# Patient Record
Sex: FEMALE | Race: WHITE | Employment: UNEMPLOYED | URBAN - METROPOLITAN AREA
[De-identification: names, ages, dates, MRNs, and addresses within clinical notes are randomized per-mention and may not be internally consistent; named-entity substitution may affect disease eponyms.]

---

## 2017-03-18 ENCOUNTER — HOSPITAL ENCOUNTER (OUTPATIENT)
Dept: MAMMOGRAPHY | Age: 42
Discharge: HOME | End: 2017-03-18
Payer: COMMERCIAL

## 2017-03-18 DIAGNOSIS — Z12.31 VISIT FOR SCREENING MAMMOGRAM: ICD-10-CM

## 2017-03-18 PROCEDURE — 77067 SCR MAMMO BI INCL CAD: CPT

## 2017-03-28 ENCOUNTER — APPOINTMENT (OUTPATIENT)
Dept: GENERAL RADIOLOGY | Age: 42
DRG: 872 | End: 2017-03-28
Attending: EMERGENCY MEDICINE
Payer: COMMERCIAL

## 2017-03-28 ENCOUNTER — HOSPITAL ENCOUNTER (INPATIENT)
Age: 42
LOS: 2 days | Discharge: HOME | DRG: 872 | End: 2017-03-30
Attending: EMERGENCY MEDICINE | Admitting: INTERNAL MEDICINE
Payer: COMMERCIAL

## 2017-03-28 DIAGNOSIS — A41.9 SEPSIS, DUE TO UNSPECIFIED ORGANISM: Primary | ICD-10-CM

## 2017-03-28 PROBLEM — J47.0 BRONCHIECTASIS WITH ACUTE LOWER RESPIRATORY INFECTION (HCC): Status: ACTIVE | Noted: 2017-03-28

## 2017-03-28 PROBLEM — R00.0 SINUS TACHYCARDIA: Status: ACTIVE | Noted: 2017-03-28

## 2017-03-28 PROBLEM — R55 NEAR SYNCOPE: Status: ACTIVE | Noted: 2017-03-28

## 2017-03-28 LAB
ALBUMIN SERPL BCP-MCNC: 3.4 G/DL (ref 3.5–5)
ALBUMIN/GLOB SERPL: 0.9 {RATIO} (ref 1.2–3.5)
ALP SERPL-CCNC: 65 U/L (ref 50–136)
ALT SERPL-CCNC: 33 U/L (ref 12–65)
ANION GAP BLD CALC-SCNC: 6 MMOL/L (ref 7–16)
AST SERPL W P-5'-P-CCNC: 37 U/L (ref 15–37)
ATRIAL RATE: 136 BPM
BASOPHILS # BLD AUTO: 0 K/UL (ref 0–0.2)
BASOPHILS # BLD: 0 % (ref 0–2)
BILIRUB SERPL-MCNC: 0.6 MG/DL (ref 0.2–1.1)
BUN SERPL-MCNC: 16 MG/DL (ref 6–23)
CALCIUM SERPL-MCNC: 7.9 MG/DL (ref 8.3–10.4)
CALCULATED R AXIS, ECG10: 83 DEGREES
CALCULATED T AXIS, ECG11: 59 DEGREES
CHLORIDE SERPL-SCNC: 108 MMOL/L (ref 98–107)
CO2 SERPL-SCNC: 26 MMOL/L (ref 21–32)
CREAT SERPL-MCNC: 0.9 MG/DL (ref 0.6–1)
DIAGNOSIS, 93000: NORMAL
DIFFERENTIAL METHOD BLD: ABNORMAL
EOSINOPHIL # BLD: 0.1 K/UL (ref 0–0.8)
EOSINOPHIL NFR BLD: 1 % (ref 0.5–7.8)
ERYTHROCYTE [DISTWIDTH] IN BLOOD BY AUTOMATED COUNT: 14.7 % (ref 11.9–14.6)
FLUAV AG NPH QL IA: NEGATIVE
FLUBV AG NPH QL IA: NEGATIVE
GLOBULIN SER CALC-MCNC: 3.6 G/DL (ref 2.3–3.5)
GLUCOSE SERPL-MCNC: 101 MG/DL (ref 65–100)
HCG UR QL: NEGATIVE
HCT VFR BLD AUTO: 49.1 % (ref 35.8–46.3)
HGB BLD-MCNC: 16.4 G/DL (ref 11.7–15.4)
IMM GRANULOCYTES # BLD: 0.1 K/UL (ref 0–0.5)
IMM GRANULOCYTES NFR BLD AUTO: 0.3 % (ref 0–5)
LACTATE BLD-SCNC: 2.3 MMOL/L (ref 0.5–1.9)
LYMPHOCYTES # BLD AUTO: 4 % (ref 13–44)
LYMPHOCYTES # BLD: 0.8 K/UL (ref 0.5–4.6)
MCH RBC QN AUTO: 32.7 PG (ref 26.1–32.9)
MCHC RBC AUTO-ENTMCNC: 33.4 G/DL (ref 31.4–35)
MCV RBC AUTO: 98 FL (ref 79.6–97.8)
MONOCYTES # BLD: 0.4 K/UL (ref 0.1–1.3)
MONOCYTES NFR BLD AUTO: 2 % (ref 4–12)
NEUTS SEG # BLD: 17.6 K/UL (ref 1.7–8.2)
NEUTS SEG NFR BLD AUTO: 93 % (ref 43–78)
P-R INTERVAL, ECG05: 120 MS
PLATELET # BLD AUTO: 420 K/UL (ref 150–450)
PMV BLD AUTO: 10.7 FL (ref 10.8–14.1)
POTASSIUM SERPL-SCNC: 4 MMOL/L (ref 3.5–5.1)
PROT SERPL-MCNC: 7 G/DL (ref 6.3–8.2)
Q-T INTERVAL, ECG07: 370 MS
QRS DURATION, ECG06: 72 MS
QTC CALCULATION (BEZET), ECG08: 556 MS
RBC # BLD AUTO: 5.01 M/UL (ref 4.05–5.25)
SODIUM SERPL-SCNC: 140 MMOL/L (ref 136–145)
VENTRICULAR RATE, ECG03: 136 BPM
WBC # BLD AUTO: 19 K/UL (ref 4.3–11.1)

## 2017-03-28 PROCEDURE — 96361 HYDRATE IV INFUSION ADD-ON: CPT | Performed by: EMERGENCY MEDICINE

## 2017-03-28 PROCEDURE — 74011250637 HC RX REV CODE- 250/637: Performed by: EMERGENCY MEDICINE

## 2017-03-28 PROCEDURE — 87804 INFLUENZA ASSAY W/OPTIC: CPT | Performed by: EMERGENCY MEDICINE

## 2017-03-28 PROCEDURE — 81003 URINALYSIS AUTO W/O SCOPE: CPT | Performed by: EMERGENCY MEDICINE

## 2017-03-28 PROCEDURE — 96374 THER/PROPH/DIAG INJ IV PUSH: CPT | Performed by: EMERGENCY MEDICINE

## 2017-03-28 PROCEDURE — 81025 URINE PREGNANCY TEST: CPT

## 2017-03-28 PROCEDURE — 83605 ASSAY OF LACTIC ACID: CPT

## 2017-03-28 PROCEDURE — 74011250636 HC RX REV CODE- 250/636: Performed by: EMERGENCY MEDICINE

## 2017-03-28 PROCEDURE — 87040 BLOOD CULTURE FOR BACTERIA: CPT | Performed by: EMERGENCY MEDICINE

## 2017-03-28 PROCEDURE — 99285 EMERGENCY DEPT VISIT HI MDM: CPT | Performed by: EMERGENCY MEDICINE

## 2017-03-28 PROCEDURE — 85025 COMPLETE CBC W/AUTO DIFF WBC: CPT | Performed by: EMERGENCY MEDICINE

## 2017-03-28 PROCEDURE — 71020 XR CHEST PA LAT: CPT

## 2017-03-28 PROCEDURE — 80053 COMPREHEN METABOLIC PANEL: CPT | Performed by: EMERGENCY MEDICINE

## 2017-03-28 PROCEDURE — 65660000000 HC RM CCU STEPDOWN

## 2017-03-28 PROCEDURE — 93005 ELECTROCARDIOGRAM TRACING: CPT | Performed by: EMERGENCY MEDICINE

## 2017-03-28 PROCEDURE — 74011250636 HC RX REV CODE- 250/636: Performed by: INTERNAL MEDICINE

## 2017-03-28 RX ORDER — METFORMIN HYDROCHLORIDE 850 MG/1
850 TABLET ORAL
Status: DISCONTINUED | OUTPATIENT
Start: 2017-03-29 | End: 2017-03-30 | Stop reason: HOSPADM

## 2017-03-28 RX ORDER — BUDESONIDE 0.5 MG/2ML
500 INHALANT ORAL
Status: DISCONTINUED | OUTPATIENT
Start: 2017-03-28 | End: 2017-03-30 | Stop reason: HOSPADM

## 2017-03-28 RX ORDER — SODIUM CHLORIDE 0.9 % (FLUSH) 0.9 %
5-10 SYRINGE (ML) INJECTION AS NEEDED
Status: DISCONTINUED | OUTPATIENT
Start: 2017-03-28 | End: 2017-03-30 | Stop reason: HOSPADM

## 2017-03-28 RX ORDER — LORAZEPAM 0.5 MG/1
0.5 TABLET ORAL
Status: DISCONTINUED | OUTPATIENT
Start: 2017-03-28 | End: 2017-03-30 | Stop reason: HOSPADM

## 2017-03-28 RX ORDER — ACETAMINOPHEN 500 MG
1000 TABLET ORAL
Status: COMPLETED | OUTPATIENT
Start: 2017-03-28 | End: 2017-03-28

## 2017-03-28 RX ORDER — GUAIFENESIN 100 MG/5ML
100 SOLUTION ORAL
Status: DISCONTINUED | OUTPATIENT
Start: 2017-03-28 | End: 2017-03-30 | Stop reason: HOSPADM

## 2017-03-28 RX ORDER — ACETAMINOPHEN AND CODEINE PHOSPHATE 120; 12 MG/5ML; MG/5ML
0.35 SOLUTION ORAL DAILY
Status: DISCONTINUED | OUTPATIENT
Start: 2017-03-29 | End: 2017-03-30 | Stop reason: HOSPADM

## 2017-03-28 RX ORDER — HYDROCHLOROTHIAZIDE 25 MG/1
12.5 TABLET ORAL DAILY
Status: DISCONTINUED | OUTPATIENT
Start: 2017-03-29 | End: 2017-03-30 | Stop reason: HOSPADM

## 2017-03-28 RX ORDER — SODIUM CHLORIDE 0.9 % (FLUSH) 0.9 %
5-10 SYRINGE (ML) INJECTION EVERY 8 HOURS
Status: DISCONTINUED | OUTPATIENT
Start: 2017-03-28 | End: 2017-03-30 | Stop reason: HOSPADM

## 2017-03-28 RX ORDER — OXYCODONE HYDROCHLORIDE 5 MG/1
5 TABLET ORAL
Status: COMPLETED | OUTPATIENT
Start: 2017-03-28 | End: 2017-03-28

## 2017-03-28 RX ORDER — ALBUTEROL SULFATE 2.5 MG/.5ML
2.5 SOLUTION RESPIRATORY (INHALATION)
Status: DISCONTINUED | OUTPATIENT
Start: 2017-03-29 | End: 2017-03-30 | Stop reason: HOSPADM

## 2017-03-28 RX ORDER — ASPIRIN 81 MG/1
81 TABLET ORAL DAILY
Status: DISCONTINUED | OUTPATIENT
Start: 2017-03-29 | End: 2017-03-30 | Stop reason: HOSPADM

## 2017-03-28 RX ORDER — IPRATROPIUM BROMIDE AND ALBUTEROL SULFATE 2.5; .5 MG/3ML; MG/3ML
3 SOLUTION RESPIRATORY (INHALATION)
Status: DISCONTINUED | OUTPATIENT
Start: 2017-03-28 | End: 2017-03-30 | Stop reason: HOSPADM

## 2017-03-28 RX ORDER — LEVOFLOXACIN 5 MG/ML
750 INJECTION, SOLUTION INTRAVENOUS EVERY 24 HOURS
Status: DISCONTINUED | OUTPATIENT
Start: 2017-03-28 | End: 2017-03-30 | Stop reason: HOSPADM

## 2017-03-28 RX ORDER — ALBUTEROL SULFATE 90 UG/1
2 AEROSOL, METERED RESPIRATORY (INHALATION)
Status: DISCONTINUED | OUTPATIENT
Start: 2017-03-28 | End: 2017-03-28

## 2017-03-28 RX ORDER — ONDANSETRON 2 MG/ML
4 INJECTION INTRAMUSCULAR; INTRAVENOUS
Status: COMPLETED | OUTPATIENT
Start: 2017-03-28 | End: 2017-03-28

## 2017-03-28 RX ADMIN — SODIUM CHLORIDE 2200 ML: 900 INJECTION, SOLUTION INTRAVENOUS at 19:00

## 2017-03-28 RX ADMIN — AZITHROMYCIN MONOHYDRATE 500 MG: 500 INJECTION, POWDER, LYOPHILIZED, FOR SOLUTION INTRAVENOUS at 23:55

## 2017-03-28 RX ADMIN — SODIUM CHLORIDE 500 ML: 900 INJECTION, SOLUTION INTRAVENOUS at 23:42

## 2017-03-28 RX ADMIN — OXYCODONE HYDROCHLORIDE 5 MG: 5 TABLET ORAL at 20:46

## 2017-03-28 RX ADMIN — ACETAMINOPHEN 1000 MG: 500 TABLET ORAL at 17:28

## 2017-03-28 RX ADMIN — ONDANSETRON 4 MG: 2 INJECTION, SOLUTION INTRAMUSCULAR; INTRAVENOUS at 19:14

## 2017-03-28 RX ADMIN — SODIUM CHLORIDE 1000 ML: 900 INJECTION, SOLUTION INTRAVENOUS at 17:28

## 2017-03-28 NOTE — ED NOTES
Pt states that she passed out at primary physician office. Pt states that while waiting in the lobby she started to have fever and chills and passed out. Pt also complains of vomiting x1 that started while she was at the doctors office.

## 2017-03-28 NOTE — ED TRIAGE NOTES
Pt states that she was sent by her primary physician due to syncope. Pt states that while waiting in the lobby she started to have fever and chills. Pt also complains of vomiting. Pt states that the N/V started while she was at the doctors office.

## 2017-03-28 NOTE — ED PROVIDER NOTES
HPI Comments: 18-year-old lady with a history of bronchiectasis who presents with concerns about fatigue, weakness, shortness of breath, and not feeling well. Patient also notes that she has had 3 episodes of blacking out but have only lasted for \"a second or 2\". Patient says that she did go to her primary care doctor who sent her here for further evaluation. She says she is currently on 3 different antibiotics for her bronchiectasis and that has been managed by pulmonary. Patient notes that she does feel fairly dehydrated. Elements of this note were created using speech recognition software. As such, errors of speech recognition may be present. Patient is a 42 y.o. female presenting with dizziness. The history is provided by the patient. Dizziness   Associated symptoms include shortness of breath and nausea. Pertinent negatives include no chest pain, no vomiting, no confusion and no headaches. Past Medical History:   Diagnosis Date    Recurrent pneumonia 6575-2952    The patient reports having been hospitalized multiple times with recurrent pneumonias during a 2 year period.  Thromboembolism ([address] 5252-0732    During one of the patient's hospitalizations, she developed deep vein thrombosis and pulmonary emboli. She took Coumadin for approximately 2 years.        Past Surgical History:   Procedure Laterality Date    ENDOMETRIAL CRYOABLATION      HX CHOLECYSTECTOMY  1994    for stones    HX HEENT      T & A    HX MYOMECTOMY      ablasion and polyp    HX TONSIL AND ADENOIDECTOMY  1981         Family History:   Problem Relation Age of Onset   [facility] COPD Mother    [facility] Cancer Mother      Lung    Breast Cancer Mother 54    Diabetes Father     Heart Disease Father     Heart Disease Sister     Hypertension Brother        Social History     Social History    Marital status:      Spouse name: N/A    Number of children: N/A    Years of education: N/A     Occupational History    Service coordinator      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 She does not use alcohol. She had considerable secondhand exposure to cigarette smoke as a child from both of her parents who smoked. There is no significant environmental or industrial exposure. She has no known exposure to TB. She has always lived in this area. She has no indoor pets or birds. She is  and living with her . ALLERGIES: Keflex [cephalexin]; Vancomycin; Penicillins; Sulfa (sulfonamide antibiotics); Sulfamethoxazole; and Prednisone    Review of Systems   Constitutional: Positive for activity change, chills, fatigue and fever. Negative for diaphoresis. HENT: Negative for congestion, rhinorrhea and sore throat. Eyes: Negative for redness and visual disturbance. Respiratory: Positive for cough and shortness of breath. Negative for chest tightness and wheezing. Cardiovascular: Negative for chest pain and palpitations. Gastrointestinal: Positive for nausea. Negative for abdominal pain, blood in stool, diarrhea and vomiting. Endocrine: Negative for polydipsia and polyuria. Genitourinary: Negative for dysuria and hematuria. Musculoskeletal: Negative for arthralgias, myalgias and neck stiffness. Skin: Negative for rash. Allergic/Immunologic: Negative for environmental allergies and food allergies. Neurological: Positive for dizziness. Negative for weakness and headaches. Hematological: Negative for adenopathy. Does not bruise/bleed easily. Psychiatric/Behavioral: Negative for confusion and sleep disturbance. The patient is not nervous/anxious.         Vitals:    03/28/17 1653 03/28/17 1659 03/28/17 1721 03/28/17 1746   BP:   133/63    Pulse:       Resp:       Temp:       SpO2: 94% 98% 96% 96%   Weight:       Height:                Physical Exam   Constitutional: She is oriented to person, place, and time. She appears well-developed and well-nourished. HENT:   Head: Normocephalic and atraumatic. Eyes: Conjunctivae and EOM are normal. Pupils are equal, round, and reactive to light. Neck: Normal range of motion. Cardiovascular: Regular rhythm. Tachycardic to the 1 teens on my exam   Pulmonary/Chest: Effort normal and breath sounds normal. No respiratory distress. She has no wheezes. She has no rales. She exhibits no tenderness. Abdominal: Soft. Bowel sounds are normal. There is no rebound and no guarding. Musculoskeletal: Normal range of motion. She exhibits no edema or tenderness. Lymphadenopathy:     She has no cervical adenopathy. Neurological: She is alert and oriented to person, place, and time. Skin: Skin is warm and dry. Psychiatric: She has a normal mood and affect. Nursing note and vitals reviewed. MDM  Number of Diagnoses or Management Options  Diagnosis management comments: Patient's lactic acid is elevated as is her white blood cell count. Chest x-ray does not show any significant new infiltrate. I did discuss the case with Dr. Renata Muñoz from [facility] who is dying with the patient and asked that I call the hospitalist for further care. I spoke with the hospitalist who kindly agreed to see the patient. Patient's antibiotic choices are very limited due to the multiple allergies she has and the fact that she has been on outpatient antibiotic therapy that appears to not be working. I discussed that with the hospitalist and they will address that issue.     ED Course       Procedures

## 2017-03-28 NOTE — IP AVS SNAPSHOT
[address] 
[phone] Patient: Spring George MRN: [id number] GCB:[birth year]/37/[birth year]012 You are allergic to the following Allergen Reactions Keflex (Cephalexin) Anaphylaxis Vancomycin Anaphylaxis Penicillins Anaphylaxis Sulfa (Sulfonamide Antibiotics) Anaphylaxis Sulfamethoxazole Other (comments) Wheezing Prednisone Swelling Face turns red, nervousness Recent Documentation Height Weight Breastfeeding? BMI OB Status Smoking Status 1.6 m 117.2 kg No 45.76 kg/m2 Ablation Never Smoker Unresulted Labs Order Current Status CULTURE, BLOOD Preliminary result CULTURE, BLOOD Preliminary result Emergency Contacts Name Discharge Info Relation Home Work Mobile [address] CAREGIVER [3] Spouse [3] [phone] About your hospitalization You were admitted on:  March 28, 2017 You last received care in the:  [facility] You were discharged on:  March 30, 2017 Unit phone number:  [phone] Why you were hospitalized Your primary diagnosis was:  Sepsis (Hcc) Your diagnoses also included:  Sinus Tachycardia, Near Syncope, Bronchiectasis With Acute Lower Respiratory Infection (Hcc) Providers Seen During Your Hospitalizations Provider Role Specialty Primary office phone Laine Chase MD Attending Provider Emergency Medicine [phone] Harshal Petit MD Attending Provider Internal Medicine [phone] Your Primary Care Physician (PCP) Primary Care Physician Office Phone Office Fax Giuseppe Zhang [phone] Follow-up Information Follow up With Details Comments Contact Info David Baird MD On 4/5/2017 at [address] [facility] [address] [facility] [id number] [phone] Your Appointments Wednesday April 05, 2017  9:15 AM EDT HOSPITAL FOLLOW-UP with Albina Gonzalez MD  
[facility] Internal Medicine ([facility] INTERNAL MEDIC[facility]) [address] Delphine Garrett [place] [address]  
[phone] Wednesday April 26, 2017  2:40 PM EDT Return appointment with Melinda Ram NP [facility] ([facility]) [address] [place] [address] [facility]  
[phone] Current Discharge Medication List  
  
CONTINUE these medications which have CHANGED Dose & Instructions Dispensing Information Comments Morning Noon Evening Bedtime  
 albuterol 90 mcg/actuation inhaler Commonly known as:  PROAIR HFA What changed:  Another medication with the same name was removed. Continue taking this medication, and follow the directions you see here. Your next dose is:  When needed Dose:  2 Puff Take 2 Puffs by inhalation every four (4) hours as needed. Quantity:  1 Inhaler Refills:  11  
     
   
   
   
  
 levoFLOXacin 750 mg tablet Commonly known as:  Elliott Oconnor What changed:   
- how much to take 
- how to take this - when to take this 
- additional instructions Your last dose was:  Yesterday 03/29/17 at 2108 Your next dose is: Today 9 pm  
   
 Dose:  750 mg Take 1 Tab by mouth daily for 6 days. Quantity:  6 Tab Refills:  0 CONTINUE these medications which have NOT CHANGED Dose & Instructions Dispensing Information Comments Morning Noon Evening Bedtime  
 aspirin 81 mg tablet Your next dose is:  3/31 Dose:  81 mg Take 81 mg by mouth. Refills:  0  
     
  
   
   
   
  
 doxycycline 100 mg tablet Commonly known as:  ADOXA Your next dose is:  Home routine Take 1 tab daily for 1 week every third month Quantity:  7 Tab Refills:  5  
     
   
   
   
  
 fluticasone-salmeterol 250-50 mcg/dose diskus inhaler Commonly known as:  ADVAIR DISKUS Your next dose is:  3/30 Dose:  1 Puff Take 1 Puff by inhalation two (2) times a day. Quantity:  1 Inhaler Refills:  11  
     
  
   
   
  
   
  
 hydroCHLOROthiazide 12.5 mg tablet Commonly known as:  HYDRODIURIL Your next dose is:  3/31 Dose:  12.5 mg Take 1 Tab by mouth daily. Quantity:  30 Tab Refills:  12 LORazepam 0.5 mg tablet Commonly known as:  ATIVAN Your next dose is:  As needed Dose:  0.5 mg Take 1 Tab by mouth two (2) times daily as needed for Anxiety. Max Daily Amount: 1 mg. Quantity:  60 Tab Refills:  3  
     
   
   
   
  
 metFORMIN  mg tablet Commonly known as:  GLUCOPHAGE XR Your next dose is:  3/31 Dose:  750 mg Take 750 mg by mouth daily. Refills:  0  
     
  
   
   
   
  
 multivitamin tablet Commonly known as:  ONE A DAY Your next dose is:  3/31 Dose:  1 Tab Take 1 Tab by mouth daily. Refills:  0  
     
  
   
   
   
  
 norethindrone 0.35 mg Tab Commonly known as:  Bobby & Bobby Your next dose is:  3/31 Dose:  0.35 mg Take 0.35 mg by mouth daily. Refills:  0 STOP taking these medications   
 azithromycin 500 mg Tab Commonly known as:  Sherryle Nutley MUCINEX 600 mg ER tablet Generic drug:  guaiFENesin ER Where to Get Your Medications Information on where to get these meds will be given to you by the nurse or doctor. ! Ask your nurse or doctor about these medications  
  levoFLOXacin 750 mg tablet Discharge Instructions Bronchiectasis: Care Instructions Your Care Instructions Bronchiectasis (say \"rizbv-pus-ZQX-tuh-heber\") is a lung problem in which the breathing tubes are stretched and become larger. The buildup of mucus causes the stretching and can lead to swelling and infections.  You may find it harder to breathe and cough up mucus out of your lungs. Some people are born with it. Other people get it because of another health problem, usually cystic fibrosis or a lung infection such as pneumonia or tuberculosis. Symptoms are often different for everyone. But a cough that brings up mucus, or sputum, is common. The condition is usually treated with antibiotics, medicines to relax the airways (bronchodilators), and medicines to make it easier to cough up mucus (expectorants). Follow-up care is a key part of your treatment and safety. Be sure to make and go to all appointments, and call your doctor if you are having problems. It's also a good idea to know your test results and keep a list of the medicines you take. How can you care for yourself at home? · Take your antibiotics as directed. Do not stop taking them just because you feel better. You need to take the full course of antibiotics. · Take your medicines exactly as prescribed. Call your doctor if you think you are having a problem with your medicine. · If you have cystic fibrosis, follow your treatment plan. · If you or your child has bronchiectasis, follow directions from your doctor or respiratory therapist for moving your or your child's body into different positions to help drain fluid. This is called postural drainage, and it helps to ease breathing and prevent infections. · You also may do chest percussion on your child. This is strong clapping of the chest with a cupped hand to vibrate the airways in the lungs. The vibration helps your child cough up mucus. You may see a respiratory therapist to learn how to do chest percussion. · Use an airway clearance device, such as a flutter valve, as directed to help remove mucus from the lungs. · Avoid lung infections. ¨ Get shots to protect against the flu and pneumococcal disease. ¨ Wash your hands frequently. ¨ Avoid close contact with people who have colds or the flu. ¨ Do not smoke or allow others to smoke around you. If you need help quitting, talk to your doctor about stop-smoking programs and medicines. These can increase your chances of quitting for good. ¨ Stay inside, if you can, on days when the pollution level is high. When should you call for help? Call 911 anytime you think you may need emergency care. For example, call if: 
· You have severe trouble breathing. · You cough up more than 1 cup of blood. Call your doctor now or seek immediate medical care if: 
· You have chest pain. · You have shortness of breath. · You have a fever. · Your mucus (sputum) is bloody or changes color. Watch closely for changes in your health, and be sure to contact your doctor if: 
· You are coughing up more sputum than before. · Your symptoms get worse or do not get better with treatment. Where can you learn more? Go to http://enrique-miranda.info/. Enter C667 in the search box to learn more about \"Bronchiectasis: Care Instructions. \" Current as of: May 23, 2016 Content Version: 11.2 © 2779-1158 [employer]. Care instructions adapted under license by [employer] (which disclaims liability or warranty for this information). If you have questions about a medical condition or this instruction, always ask your healthcare professional. [address] any warranty or liability for your use of this information. Sepsis: Care Instructions Your Care Instructions Sepsis is an infection that has spread throughout your body. It is a life-threatening condition and often causes extremely low blood pressure. This can lead to problems with many different organs. The cause of sepsis is not always clear, but it can happen as part of a long-term or sudden illness. Sometimes even a mild illness can lead to sepsis. Follow-up care is a key part of your treatment and safety.  Be sure to make and go to all appointments, and call your doctor if you are having problems. Its also a good idea to know your test results and keep a list of the medicines you take. How can you care for yourself at home? · If your doctor prescribed antibiotics, take them as directed. Do not stop taking them just because you feel better. You need to take the full course of antibiotics. · Drink plenty of fluids, enough so that your urine is light yellow or clear like water. Choose water or caffeine-free clear liquids until you feel better. If you have kidney, heart, or liver disease and have to limit fluids, talk with your doctor before you increase your fluid intake. You can try rehydration drinks, such as Gatorade or Powerade. · Do not drink alcohol. · Eat a healthy diet. Include fruits, vegetables, and whole grains in your diet every day. · Walking is an easy way to get exercise. Gradually increase the amount you walk every day. Make sure your doctor knows that you are starting an exercise program. 
· Do not smoke or use other tobacco products. If you need help quitting, talk to your doctor about stop-smoking programs and medicines. These can increase your chances of quitting for good. When should you call for help? Call 911 anytime you think you may need emergency care. For example, call if: 
· You passed out (lost consciousness). Call your doctor now or seek immediate medical care if: 
· You have a fever or chills. · You have cool, pale, or clammy skin. · You are dizzy or lightheaded, or you feel like you may faint. · You have any new symptoms, such as a cough, pain in one part of your body, or urinary problems. Watch closely for changes in your health, and be sure to contact your doctor if: 
· You do not get better as expected. Where can you learn more? Go to http://enrique-miranda.info/. Enter W491 in the search box to learn more about \"Sepsis: Care Instructions. \" 
 Current as of: May 27, 2016 Content Version: 11.2 © 7084-1482 [employer]. Care instructions adapted under license by [employer] (which disclaims liability or warranty for this information). If you have questions about a medical condition or this instruction, always ask your healthcare professional. [address] any warranty or liability for your use of this information. DISCHARGE SUMMARY from Nurse The following personal items are in your possession at time of discharge: 
 
Dental Appliances: None Visual Aid: Glasses Home Medications: None Jewelry: None Clothing: Footwear, Pants, Jacket/Coat Other Valuables: None PATIENT INSTRUCTIONS: 
 
After general anesthesia or intravenous sedation, for 24 hours or while taking prescription Narcotics: 
Limit your activities Do not drive and operate hazardous machinery Do not make important personal or business decisions Do  not drink alcoholic beverages If you have not urinated within 8 hours after discharge, please contact your surgeon on call. Report the following to your surgeon: Excessive pain, swelling, redness or odor of or around the surgical area Temperature over 100.5 Nausea and vomiting lasting longer than 4 hours or if unable to take medications Any signs of decreased circulation or nerve impairment to extremity: change in color, persistent  numbness, tingling, coldness or increase pain Any questions What to do at Home: 
Recommended activity: Activity as tolerated, *  Please give a list of your current medications to your Primary Care Provider. *  Please update this list whenever your medications are discontinued, doses are 
    changed, or new medications (including over-the-counter products) are added. *  Please carry medication information at all times in case of emergency situations. These are general instructions for a healthy lifestyle: No smoking/ No tobacco products/ Avoid exposure to second hand smoke Surgeon General's Warning:  Quitting smoking now greatly reduces serious risk to your health. Obesity, smoking, and sedentary lifestyle greatly increases your risk for illness A healthy diet, regular physical exercise & weight monitoring are important for maintaining a healthy lifestyle You may be retaining fluid if you have a history of heart failure or if you experience any of the following symptoms:  Weight gain of 3 pounds or more overnight or 5 pounds in a week, increased swelling in our hands or feet or shortness of breath while lying flat in bed. Please call your doctor as soon as you notice any of these symptoms; do not wait until your next office visit. Recognize signs and symptoms of STROKE: 
 
F-face looks uneven A-arms unable to move or move unevenly S-speech slurred or non-existent T-time-call 911 as soon as signs and symptoms begin-DO NOT go Back to bed or wait to see if you get better-TIME IS BRAIN. Warning Signs of HEART ATTACK Call 911 if you have these symptoms: 
Chest discomfort. Most heart attacks involve discomfort in the center of the chest that lasts more than a few minutes, or that goes away and comes back. It can feel like uncomfortable pressure, squeezing, fullness, or pain. Discomfort in other areas of the upper body. Symptoms can include pain or discomfort in one or both arms, the back, neck, jaw, or stomach. Shortness of breath with or without chest discomfort. Other signs may include breaking out in a cold sweat, nausea, or lightheadedness. Don't wait more than five minutes to call [address]! Fast action can save your life. Calling 911 is almost always the fastest way to get lifesaving treatment. Emergency Medical Services staff can begin treatment when they arrive  up to an hour sooner than if someone gets to the hospital by car. The discharge information has been reviewed with the patient. The patient verbalized understanding. Discharge medications reviewed with the patient and appropriate educational materials and side effects teaching were provided. Discharge Orders None [employer] Announcement We are excited to announce that we are making your provider's discharge notes available to you in [employer]. You will see these notes when they are completed and signed by the physician that discharged you from your recent hospital stay. If you have any questions or concerns about any information you see in [employer], please call the Health Information Department where you were seen or reach out to your Primary Care Provider for more information about your plan of care. Introducing [facility]! Dear Susanne Clark: 
Thank you for requesting a [employer] account. Our records indicate that you already have an active [employer] account. You can access your account anytime at [email or web address]. [email or web address] Did you know that you can access your hospital and ER discharge instructions at any time in [employer]? You can also review all of your test results from your hospital stay or ER visit. Additional Information If you have questions, please visit the Frequently Asked Questions section of the [employer] website at [email or web address] Remember, [employer] is NOT to be used for urgent needs. For medical emergencies, dial 911. Now available from your iPhone and Android! General Information Please provide this summary of care documentation to your next provider. Patient Signature:  ____________________________________________________________ Date:  ____________________________________________________________  
  
[place] Provider Signature:  ____________________________________________________________ Date:  ____________________________________________________________

## 2017-03-29 LAB
GLUCOSE BLD STRIP.AUTO-MCNC: 101 MG/DL (ref 65–100)
LACTATE SERPL-SCNC: 1.6 MMOL/L (ref 0.4–2)

## 2017-03-29 PROCEDURE — 65270000029 HC RM PRIVATE

## 2017-03-29 PROCEDURE — 83605 ASSAY OF LACTIC ACID: CPT | Performed by: INTERNAL MEDICINE

## 2017-03-29 PROCEDURE — 94640 AIRWAY INHALATION TREATMENT: CPT

## 2017-03-29 PROCEDURE — 74011000250 HC RX REV CODE- 250: Performed by: INTERNAL MEDICINE

## 2017-03-29 PROCEDURE — 74011250636 HC RX REV CODE- 250/636: Performed by: INTERNAL MEDICINE

## 2017-03-29 PROCEDURE — 36415 COLL VENOUS BLD VENIPUNCTURE: CPT | Performed by: INTERNAL MEDICINE

## 2017-03-29 PROCEDURE — 74011250637 HC RX REV CODE- 250/637: Performed by: INTERNAL MEDICINE

## 2017-03-29 PROCEDURE — 94760 N-INVAS EAR/PLS OXIMETRY 1: CPT

## 2017-03-29 PROCEDURE — 82962 GLUCOSE BLOOD TEST: CPT

## 2017-03-29 PROCEDURE — 65660000000 HC RM CCU STEPDOWN

## 2017-03-29 RX ORDER — OXYCODONE AND ACETAMINOPHEN 5; 325 MG/1; MG/1
1 TABLET ORAL
Status: DISCONTINUED | OUTPATIENT
Start: 2017-03-29 | End: 2017-03-30 | Stop reason: HOSPADM

## 2017-03-29 RX ORDER — ENOXAPARIN SODIUM 100 MG/ML
40 INJECTION SUBCUTANEOUS EVERY 12 HOURS
Status: DISCONTINUED | OUTPATIENT
Start: 2017-03-29 | End: 2017-03-30 | Stop reason: HOSPADM

## 2017-03-29 RX ADMIN — LEVOFLOXACIN 750 MG: 5 INJECTION, SOLUTION INTRAVENOUS at 21:08

## 2017-03-29 RX ADMIN — Medication 10 ML: at 00:01

## 2017-03-29 RX ADMIN — ENOXAPARIN SODIUM 40 MG: 40 INJECTION SUBCUTANEOUS at 21:08

## 2017-03-29 RX ADMIN — ALBUTEROL SULFATE 2.5 MG: 2.5 SOLUTION RESPIRATORY (INHALATION) at 08:12

## 2017-03-29 RX ADMIN — HYDROCHLOROTHIAZIDE 12.5 MG: 25 TABLET ORAL at 08:01

## 2017-03-29 RX ADMIN — ACETAMINOPHEN 500 MG: 160 SOLUTION ORAL at 06:05

## 2017-03-29 RX ADMIN — Medication 5 ML: at 14:00

## 2017-03-29 RX ADMIN — MULTIPLE VITAMINS W/ MINERALS TAB 1 TABLET: TAB at 08:01

## 2017-03-29 RX ADMIN — BUDESONIDE 500 MCG: 0.5 INHALANT RESPIRATORY (INHALATION) at 08:12

## 2017-03-29 RX ADMIN — METFORMIN HYDROCHLORIDE 850 MG: 850 TABLET, FILM COATED ORAL at 08:01

## 2017-03-29 RX ADMIN — ACETAMINOPHEN 500 MG: 160 SOLUTION ORAL at 13:00

## 2017-03-29 RX ADMIN — Medication 5 ML: at 05:44

## 2017-03-29 RX ADMIN — ENOXAPARIN SODIUM 40 MG: 40 INJECTION SUBCUTANEOUS at 11:17

## 2017-03-29 RX ADMIN — LEVOFLOXACIN 750 MG: 5 INJECTION, SOLUTION INTRAVENOUS at 01:19

## 2017-03-29 RX ADMIN — BUDESONIDE 500 MCG: 0.5 INHALANT RESPIRATORY (INHALATION) at 20:54

## 2017-03-29 RX ADMIN — ALBUTEROL SULFATE 2.5 MG: 2.5 SOLUTION RESPIRATORY (INHALATION) at 01:15

## 2017-03-29 RX ADMIN — ALBUTEROL SULFATE 2.5 MG: 2.5 SOLUTION RESPIRATORY (INHALATION) at 14:30

## 2017-03-29 RX ADMIN — LORAZEPAM 0.5 MG: 0.5 TABLET ORAL at 23:56

## 2017-03-29 RX ADMIN — OXYCODONE HYDROCHLORIDE AND ACETAMINOPHEN 1 TABLET: 5; 325 TABLET ORAL at 17:30

## 2017-03-29 RX ADMIN — ASPIRIN 81 MG: 81 TABLET, COATED ORAL at 08:01

## 2017-03-29 RX ADMIN — ALBUTEROL SULFATE 2.5 MG: 2.5 SOLUTION RESPIRATORY (INHALATION) at 20:54

## 2017-03-29 RX ADMIN — AZITHROMYCIN MONOHYDRATE 500 MG: 500 INJECTION, POWDER, LYOPHILIZED, FOR SOLUTION INTRAVENOUS at 21:08

## 2017-03-29 RX ADMIN — Medication 10 ML: at 21:08

## 2017-03-29 NOTE — PROGRESS NOTES
Pt resting in bed with eyes closed. No s/sx of distress noted at this time. Pt reports headache is better at this time. Pt encouraged to call for assistance if needed call light in reach, door open will monitor.

## 2017-03-29 NOTE — PROGRESS NOTES
Hospitalist Progress Note    3/29/2017  Admit Date: 3/28/2017  3:47 PM   NAME: Swapnil Lomeli   :  1975   MRN:  [id number]   Attending: Nathalie Gomez MD  PCP:  Juan Miguel Simmons MD    SUBJECTIVE:   Patient presented with dizziness and fevers. Started on sepsis protocol. zithromax and levaquin started for bronchiectasis. Still with some dizziness and general weakness this am.        Review of Systems negative with exception of pertinent positives noted above  PHYSICAL EXAM     Visit Vitals    /66    Pulse 93    Temp 98.5 °F (36.9 °C)    Resp 22    Ht 5' 3\" (1.6 m)    Wt 117.2 kg (258 lb 4.8 oz)    LMP  (LMP Unknown)    SpO2 98%    Breastfeeding No    BMI 45.76 kg/m2      Temp (24hrs), Av.7 °F (37.1 °C), Min:98 °F (36.7 °C), Max:100.7 °F (38.2 °C)    Oxygen Therapy  O2 Sat (%): 98 % (17 08)  Pulse via Oximetry: 108 beats per minute (17 08)  O2 Device: Room air (17)  No intake or output data in the 24 hours ending 17 09   General: No acute distress    Lungs:  Coarse bs rt side   Heart:  Regular rate and rhythm,  No murmur, rub, or gallop  Abdomen: Soft, Non distended, Non tender, Positive bowel sounds  Extremities: No cyanosis, clubbing or edema  Neurologic:  No focal deficits    ASSESSMENT      Active Hospital Problems    Diagnosis Date Noted    Sinus tachycardia 2017    Sepsis ([address] 2017    Near syncope 2017    Bronchiectasis with acute lower respiratory infection ([address] 2017     Plan:  · Continue abx. Monitor cx  · Check orthostatic bp  · Fever curve improving.       DVT Prophylaxis:  lovenox    Signed By: Macario Norris MD     2017

## 2017-03-29 NOTE — PROGRESS NOTES
TRANSFER - IN REPORT:    Verbal report received from Syracuse Davis RN on Dena Sood  being received from ER for routine progression of care      Report consisted of patients Situation, Background, Assessment and   Recommendations(SBAR). Information from the following report(s) MAR was reviewed with the receiving nurse. Opportunity for questions and clarification was provided. Assessment completed upon patients arrival to unit and care assumed.

## 2017-03-29 NOTE — PROGRESS NOTES
Pt resting in bed with  at bedside. Pt alert oriented times 3 a this time. Pt complaint of headache 5/10 at this time. Pt complaints of feeling sweaty. No acute distress noted at this time. Pt reports not feeling better than before coming into the hospital. Pt encouraged to call for assistance if needed call light in reach, door open will monitor.

## 2017-03-29 NOTE — PROGRESS NOTES
Pt states that she wears a full cpt vest at home twice daily. I informed her that we do have a cpt vest wrap and also vibralung. Pt stated she would like to try vibralung and if that isn't effective she will have her  bring her full cpt vest in because she feels it is more beneficial than the cpt wrap that we have.

## 2017-03-29 NOTE — PROGRESS NOTES
Pt with complaint of headache and request something other than tylenol for headache. Pt and family asking for [facility] to see pt while in hospital. Dr. Marion Loya on floor and made aware. MD already aware and no reason for pulmonary consult at this time. Will monitor for new orders for pain meds.

## 2017-03-29 NOTE — PROGRESS NOTES
Physical assessment completed, pt alert and oriented, family at bedside, call light within reach pt on room air.

## 2017-03-29 NOTE — PROGRESS NOTES
Patient received in bed, offers no complaints. Shift assessment completed. Patient's  at bedside, will monitor.

## 2017-03-29 NOTE — PROGRESS NOTES
Problem: Interdisciplinary Rounds  Goal: Interdisciplinary Rounds  Interdisciplinary team rounds were held 3/29/2017 with the following team members:Care Management, Nursing and Clinical Coordinator and the patient. Plan of care discussed. See clinical pathway and/or care plan for interventions and desired outcomes.

## 2017-03-29 NOTE — ED NOTES
TRANSFER - OUT REPORT:    Verbal report given to Mimi Martines on Gualberto Galvez  being transferred to 8th floor for routine progression of care       Report consisted of patients Situation, Background, Assessment and   Recommendations(SBAR). Information from the following report(s) SBAR was reviewed with the receiving nurse. Lines:   Peripheral IV 03/28/17 Right Antecubital (Active)   Site Assessment Clean, dry, & intact 3/28/2017  7:19 PM   Phlebitis Assessment 0 3/28/2017  7:19 PM   Infiltration Assessment 0 3/28/2017  7:19 PM   Dressing Status Clean, dry, & intact 3/28/2017  7:19 PM        Opportunity for questions and clarification was provided.       Patient transported with:   [employer]

## 2017-03-29 NOTE — H&P
HOSPITALIST H&P/CONSULT  NAME:  Audie Hartman   Age:  42 y.o.  :   1975   MRN:   [id number]  PCP: Lyndy Mohs, MD  Treatment Team: Attending Provider: Temitope Reyna MD; Primary Nurse: Anna Cage  HPI:   42year old Perla Choe was brought to the Ed by  because of recurrent dizziness over the last 3 days and   2 episodes of syncopy today-one at work and the second one at her doctor's office. That was associated with nausea and   Vomiting. When she was evaluated in the Ed she has normal BP but she was tachycardic with low grade fever. CBC showed severe Leukocytosis with   Left shift. Her PMH could explain this. She is an unfortunate,never smoked ,never drank female who had second hand smoking from both parents  In childhood and ended up with Bronchiectasis in the right middle lobe. She does have recurrent exacerbations,last one being few weeks back. She has other significant medical History-DVT/PE,Endometriosis s/p endometrial ablation and still on progesterone,Polycystic ovaries for which she is on   Metformin. Complete ROS done and is as stated in HPI or otherwise negative  Past Medical History:   Diagnosis Date    Recurrent pneumonia [id number]    The patient reports having been hospitalized multiple times with recurrent pneumonias during a 2 year period.  Thromboembolism ([facility] [address] [id number]    During one of the patient's hospitalizations, she developed deep vein thrombosis and pulmonary emboli. She took Coumadin for approximately 2 years. Past Surgical History:   Procedure Laterality Date    ENDOMETRIAL CRYOABLATION      HX CHOLECYSTECTOMY      for stones    HX HEENT      T & A    HX MYOMECTOMY      ablasion and polyp    HX TONSIL AND ADENOIDECTOMY        Prior to Admission Medications   Prescriptions Last Dose Informant Patient Reported? Taking?    LORazepam (ATIVAN) 0.5 mg tablet   No No   Sig: Take 1 Tab by mouth two (2) times daily as needed for Anxiety. Max Daily Amount: 1 mg.   albuterol (PROAIR HFA) 90 mcg/actuation inhaler   No No   Sig: Take 2 Puffs by inhalation every four (4) hours as needed. albuterol (PROVENTIL VENTOLIN) 2.5 mg /3 mL (0.083 %) nebulizer solution   No No   Sig: 3 mL by Nebulization route every six (6) hours. DX J45.909 Asthma   aspirin 81 mg tablet   Yes No   Sig: Take 81 mg by mouth. azithromycin (ZITHROMAX) 500 mg tab   No No   Sig: Take 1 Tab by mouth daily. Take 1 daily for 5 days every third month   doxycycline (ADOXA) 100 mg tablet   No No   Sig: Take 1 tab daily for 1 week every third month   fluticasone-salmeterol (ADVAIR DISKUS) 250-50 mcg/dose diskus inhaler   No No   Sig: Take 1 Puff by inhalation two (2) times a day. guaiFENesin ER (MUCINEX) 600 mg ER tablet   Yes No   Sig: Take 600 mg by mouth two (2) times a day. hydroCHLOROthiazide (HYDRODIURIL) 12.5 mg tablet   No No   Sig: Take 1 Tab by mouth daily. levoFLOXacin (LEVAQUIN) 750 mg tablet   No No   Sig: Take 1 tab daily for 1 week every third month   metFORMIN ER (GLUCOPHAGE XR) 750 mg tablet   Yes No   Sig: Take 750 mg by mouth daily. multivitamin (ONE A DAY) tablet   Yes No   Sig: Take 1 Tab by mouth daily. norethindrone (MICRONOR) 0.35 mg tab   Yes No   Sig: Take 0.35 mg by mouth daily.       Facility-Administered Medications: None     Allergies   Allergen Reactions    Keflex [Cephalexin] Anaphylaxis    Vancomycin Anaphylaxis    Penicillins Anaphylaxis    Sulfa (Sulfonamide Antibiotics) Anaphylaxis    Sulfamethoxazole Other (comments)     Wheezing     Prednisone Swelling     Face turns red, nervousness      Social History   Substance Use Topics    Smoking status: Never Smoker    Smokeless tobacco: Never Used    Alcohol use No      Family History   Problem Relation Age of Onset    COPD Mother     Cancer Mother      Lung    Breast Cancer Mother 54    Diabetes Father     Heart Disease Father     Heart Disease Sister     Hypertension Brother       Objective:     Visit Vitals    /63    Pulse (!) 140    Temp (!) 100.7 °F (38.2 °C)    Resp 18    Ht 5' 3\" (1.6 m)    Wt 108.9 kg (240 lb)    SpO2 96%    BMI 42.51 kg/m2      Temp (24hrs), Av.5 °F (37.5 °C), Min:98.3 °F (36.8 °C), Max:100.7 °F (38.2 °C)    Oxygen Therapy  O2 Sat (%): 96 % (17)  Pulse via Oximetry: 119 beats per minute (17)  O2 Device: Room air (17)  Physical Exam:  General:    Well developed ,obese  female in noacute distress. Head:   Normocephalic, atraumatic,EOMI,PERRLA,Neck supple. Nose:  Nares normal. No drainage or sinus tenderness. Lungs:   Clear to auscultation bilaterally. No Wheezing or Rhonchi. No rales. Heart:   S1S2 tachycardic,  no murmur, rub or gallop. Abdomen:   Full,moves with respiration,soft, non-tender. no organomegaly,normal bowel sounds. Extremities: No cyanosis. No edema. No clubbing  Skin:     Texture, turgor normal. No rashes or lesions. Not Jaundiced  Neurologic: Alert and oriented  In all spheres. No focal neurologic deficit.   Data Review:   Recent Results (from the past 24 hour(s))   EKG, 12 LEAD, INITIAL    Collection Time: 17  1:36 PM   Result Value Ref Range    Ventricular Rate 136 BPM    Atrial Rate 136 BPM    P-R Interval 120 ms    QRS Duration 72 ms    Q-T Interval 370 ms    QTC Calculation (Bezet) 556 ms    Calculated R Axis 83 degrees    Calculated T Axis 59 degrees    Diagnosis       Sinus tachycardia  Nonspecific T wave abnormality  Abnormal ECG  No previous ECGs available  Confirmed by Sukhdev Holland MD (), CARLITOS DAWSON ([id number]) on 3/28/2017 4:42:48 PM     CBC WITH AUTOMATED DIFF    Collection Time: 17  1:47 PM   Result Value Ref Range    WBC 19.0 (H) 4.3 - 11.1 K/uL    RBC 5.01 4.05 - 5.25 M/uL    HGB 16.4 (H) 11.7 - 15.4 g/dL    HCT 49.1 (H) 35.8 - 46.3 %    MCV 98.0 (H) 79.6 - 97.8 FL    MCH 32.7 26.1 - 32.9 PG    MCHC 33.4 31.4 - 35.0 g/dL    RDW 14.7 (H) 11.9 - 14.6 % PLATELET 304 039 - 201 K/uL    MPV 10.7 (L) 10.8 - 14.1 FL    DF AUTOMATED      NEUTROPHILS 93 (H) 43 - 78 %    LYMPHOCYTES 4 (L) 13 - 44 %    MONOCYTES 2 (L) 4.0 - 12.0 %    EOSINOPHILS 1 0.5 - 7.8 %    BASOPHILS 0 0.0 - 2.0 %    IMMATURE GRANULOCYTES 0.3 0.0 - 5.0 %    ABS. NEUTROPHILS 17.6 (H) 1.7 - 8.2 K/UL    ABS. LYMPHOCYTES 0.8 0.5 - 4.6 K/UL    ABS. MONOCYTES 0.4 0.1 - 1.3 K/UL    ABS. EOSINOPHILS 0.1 0.0 - 0.8 K/UL    ABS. BASOPHILS 0.0 0.0 - 0.2 K/UL    ABS. IMM. GRANS. 0.1 0.0 - 0.5 K/UL   INFLUENZA A & B AG (RAPID TEST)    Collection Time: 03/28/17  4:55 PM   Result Value Ref Range    Influenza A Ag NEGATIVE  NEG      Influenza B Ag NEGATIVE  NEG     METABOLIC PANEL, COMPREHENSIVE    Collection Time: 03/28/17  6:30 PM   Result Value Ref Range    Sodium 140 136 - 145 mmol/L    Potassium 4.0 3.5 - 5.1 mmol/L    Chloride 108 (H) 98 - 107 mmol/L    CO2 26 21 - 32 mmol/L    Anion gap 6 (L) 7 - 16 mmol/L    Glucose 101 (H) 65 - 100 mg/dL    BUN 16 6 - 23 MG/DL    Creatinine 0.90 0.6 - 1.0 MG/DL    GFR est AA >60 >60 ml/min/1.73m2    GFR est non-AA >60 >60 ml/min/1.73m2    Calcium 7.9 (L) 8.3 - 10.4 MG/DL    Bilirubin, total 0.6 0.2 - 1.1 MG/DL    ALT (SGPT) 33 12 - 65 U/L    AST (SGOT) 37 15 - 37 U/L    Alk.  phosphatase 65 50 - 136 U/L    Protein, total 7.0 6.3 - 8.2 g/dL    Albumin 3.4 (L) 3.5 - 5.0 g/dL    Globulin 3.6 (H) 2.3 - 3.5 g/dL    A-G Ratio 0.9 (L) 1.2 - 3.5     POC LACTIC ACID    Collection Time: 03/28/17  6:35 PM   Result Value Ref Range    Lactic Acid (POC) 2.3 (H) 0.5 - 1.9 mmol/L   HCG URINE, QL. - POC    Collection Time: 03/28/17  6:54 PM   Result Value Ref Range    Pregnancy test,urine (POC) NEGATIVE  NEG           Assessment and Plan:   -SEPSIS  -NEAR SYNCOPE  -SINUS TACHYCARDIA  -BRONCHIECTASIS RML  -ADMIT TO MEDICINE WITH REMOTE TELEMETRY  -IV LEVAQUIN 2 GM Q 24 HRS  -IV ZITHROMAX  -IVF NORMAL SALINE AT 75 MLS /HR X 1 LITER  -TYLENOL PRN FEVER/PAIN  -ALBUTEROL NEBS PRN  -ROBITUSSIN -HOME MEDS  ·          Marc Calvillo MD

## 2017-03-30 VITALS
HEART RATE: 86 BPM | SYSTOLIC BLOOD PRESSURE: 118 MMHG | BODY MASS INDEX: 45.77 KG/M2 | RESPIRATION RATE: 20 BRPM | WEIGHT: 258.3 LBS | HEIGHT: 63 IN | TEMPERATURE: 97.8 F | DIASTOLIC BLOOD PRESSURE: 60 MMHG | OXYGEN SATURATION: 97 %

## 2017-03-30 LAB
ERYTHROCYTE [DISTWIDTH] IN BLOOD BY AUTOMATED COUNT: 14.6 % (ref 11.9–14.6)
GLUCOSE BLD STRIP.AUTO-MCNC: 94 MG/DL (ref 65–100)
HCT VFR BLD AUTO: 38.3 % (ref 35.8–46.3)
HGB BLD-MCNC: 12.4 G/DL (ref 11.7–15.4)
MCH RBC QN AUTO: 31.6 PG (ref 26.1–32.9)
MCHC RBC AUTO-ENTMCNC: 32.4 G/DL (ref 31.4–35)
MCV RBC AUTO: 97.7 FL (ref 79.6–97.8)
PLATELET # BLD AUTO: 303 K/UL (ref 150–450)
PMV BLD AUTO: 10.1 FL (ref 10.8–14.1)
RBC # BLD AUTO: 3.92 M/UL (ref 4.05–5.25)
WBC # BLD AUTO: 4.7 K/UL (ref 4.3–11.1)

## 2017-03-30 PROCEDURE — 74011250636 HC RX REV CODE- 250/636: Performed by: INTERNAL MEDICINE

## 2017-03-30 PROCEDURE — 74011000250 HC RX REV CODE- 250: Performed by: INTERNAL MEDICINE

## 2017-03-30 PROCEDURE — 85027 COMPLETE CBC AUTOMATED: CPT | Performed by: INTERNAL MEDICINE

## 2017-03-30 PROCEDURE — 36415 COLL VENOUS BLD VENIPUNCTURE: CPT | Performed by: INTERNAL MEDICINE

## 2017-03-30 PROCEDURE — 94640 AIRWAY INHALATION TREATMENT: CPT

## 2017-03-30 PROCEDURE — 74011250637 HC RX REV CODE- 250/637: Performed by: INTERNAL MEDICINE

## 2017-03-30 PROCEDURE — 82962 GLUCOSE BLOOD TEST: CPT

## 2017-03-30 PROCEDURE — 94760 N-INVAS EAR/PLS OXIMETRY 1: CPT

## 2017-03-30 RX ORDER — LEVOFLOXACIN 750 MG/1
750 TABLET ORAL DAILY
Qty: 6 TAB | Refills: 0 | Status: SHIPPED | OUTPATIENT
Start: 2017-03-30 | End: 2017-04-05

## 2017-03-30 RX ADMIN — ALBUTEROL SULFATE 2.5 MG: 2.5 SOLUTION RESPIRATORY (INHALATION) at 07:41

## 2017-03-30 RX ADMIN — ALBUTEROL SULFATE 2.5 MG: 2.5 SOLUTION RESPIRATORY (INHALATION) at 02:18

## 2017-03-30 RX ADMIN — OXYCODONE HYDROCHLORIDE AND ACETAMINOPHEN 1 TABLET: 5; 325 TABLET ORAL at 09:29

## 2017-03-30 RX ADMIN — MULTIPLE VITAMINS W/ MINERALS TAB 1 TABLET: TAB at 09:22

## 2017-03-30 RX ADMIN — ASPIRIN 81 MG: 81 TABLET, COATED ORAL at 09:22

## 2017-03-30 RX ADMIN — METFORMIN HYDROCHLORIDE 850 MG: 850 TABLET, FILM COATED ORAL at 09:22

## 2017-03-30 RX ADMIN — BUDESONIDE 500 MCG: 0.5 INHALANT RESPIRATORY (INHALATION) at 07:41

## 2017-03-30 RX ADMIN — HYDROCHLOROTHIAZIDE 12.5 MG: 25 TABLET ORAL at 09:22

## 2017-03-30 RX ADMIN — ENOXAPARIN SODIUM 40 MG: 40 INJECTION SUBCUTANEOUS at 09:22

## 2017-03-30 RX ADMIN — Medication 10 ML: at 05:28

## 2017-03-30 NOTE — PROGRESS NOTES
Patient sleeping in bed, no s/s distress/discomfort noted. Patient's  remains at bedside, will monitor.

## 2017-03-30 NOTE — DISCHARGE INSTRUCTIONS
Bronchiectasis: Care Instructions  Your Care Instructions  Bronchiectasis (say \"ujima-kjf-JBV-tuh-heber\") is a lung problem in which the breathing tubes are stretched and become larger. The buildup of mucus causes the stretching and can lead to swelling and infections. You may find it harder to breathe and cough up mucus out of your lungs. Some people are born with it. Other people get it because of another health problem, usually cystic fibrosis or a lung infection such as pneumonia or tuberculosis. Symptoms are often different for everyone. But a cough that brings up mucus, or sputum, is common. The condition is usually treated with antibiotics, medicines to relax the airways (bronchodilators), and medicines to make it easier to cough up mucus (expectorants). Follow-up care is a key part of your treatment and safety. Be sure to make and go to all appointments, and call your doctor if you are having problems. It's also a good idea to know your test results and keep a list of the medicines you take. How can you care for yourself at home? · Take your antibiotics as directed. Do not stop taking them just because you feel better. You need to take the full course of antibiotics. · Take your medicines exactly as prescribed. Call your doctor if you think you are having a problem with your medicine. · If you have cystic fibrosis, follow your treatment plan. · If you or your child has bronchiectasis, follow directions from your doctor or respiratory therapist for moving your or your child's body into different positions to help drain fluid. This is called postural drainage, and it helps to ease breathing and prevent infections. · You also may do chest percussion on your child. This is strong clapping of the chest with a cupped hand to vibrate the airways in the lungs. The vibration helps your child cough up mucus. You may see a respiratory therapist to learn how to do chest percussion.   · Use an airway clearance device, such as a flutter valve, as directed to help remove mucus from the lungs. · Avoid lung infections. ¨ Get shots to protect against the flu and pneumococcal disease. ¨ Wash your hands frequently. ¨ Avoid close contact with people who have colds or the flu. ¨ Do not smoke or allow others to smoke around you. If you need help quitting, talk to your doctor about stop-smoking programs and medicines. These can increase your chances of quitting for good. ¨ Stay inside, if you can, on days when the pollution level is high. When should you call for help? Call 911 anytime you think you may need emergency care. For example, call if:  · You have severe trouble breathing. · You cough up more than 1 cup of blood. Call your doctor now or seek immediate medical care if:  · You have chest pain. · You have shortness of breath. · You have a fever. · Your mucus (sputum) is bloody or changes color. Watch closely for changes in your health, and be sure to contact your doctor if:  · You are coughing up more sputum than before. · Your symptoms get worse or do not get better with treatment. Where can you learn more? Go to http://enrique-miranda.info/. Enter C667 in the search box to learn more about \"Bronchiectasis: Care Instructions. \"  Current as of: May 23, 2016  Content Version: 11.2  © 5909-1252 [employer]. Care instructions adapted under license by [employer] (which disclaims liability or warranty for this information). If you have questions about a medical condition or this instruction, always ask your healthcare professional. [place] [address] any warranty or liability for your use of this information. Sepsis: Care Instructions  Your Care Instructions  Sepsis is an infection that has spread throughout your body. It is a life-threatening condition and often causes extremely low blood pressure.  This can lead to problems with many different organs. The cause of sepsis is not always clear, but it can happen as part of a long-term or sudden illness. Sometimes even a mild illness can lead to sepsis. Follow-up care is a key part of your treatment and safety. Be sure to make and go to all appointments, and call your doctor if you are having problems. Its also a good idea to know your test results and keep a list of the medicines you take. How can you care for yourself at home? · If your doctor prescribed antibiotics, take them as directed. Do not stop taking them just because you feel better. You need to take the full course of antibiotics. · Drink plenty of fluids, enough so that your urine is light yellow or clear like water. Choose water or caffeine-free clear liquids until you feel better. If you have kidney, heart, or liver disease and have to limit fluids, talk with your doctor before you increase your fluid intake. You can try rehydration drinks, such as Gatorade or Powerade. · Do not drink alcohol. · Eat a healthy diet. Include fruits, vegetables, and whole grains in your diet every day. · Walking is an easy way to get exercise. Gradually increase the amount you walk every day. Make sure your doctor knows that you are starting an exercise program.  · Do not smoke or use other tobacco products. If you need help quitting, talk to your doctor about stop-smoking programs and medicines. These can increase your chances of quitting for good. When should you call for help? Call 911 anytime you think you may need emergency care. For example, call if:  · You passed out (lost consciousness). Call your doctor now or seek immediate medical care if:  · You have a fever or chills. · You have cool, pale, or clammy skin. · You are dizzy or lightheaded, or you feel like you may faint. · You have any new symptoms, such as a cough, pain in one part of your body, or urinary problems.   Watch closely for changes in your health, and be sure to contact your doctor if:  · You do not get better as expected. Where can you learn more? Go to http://enrique-miranda.info/. Enter R582 in the search box to learn more about \"Sepsis: Care Instructions. \"  Current as of: May 27, 2016  Content Version: 11.2  © 9614-6853 [employer]. Care instructions adapted under license by [employer] (which disclaims liability or warranty for this information). If you have questions about a medical condition or this instruction, always ask your healthcare professional. [address] any warranty or liability for your use of this information. DISCHARGE SUMMARY from Nurse    The following personal items are in your possession at time of discharge:    Dental Appliances: None  Visual Aid: Glasses     Home Medications: None  Jewelry: None  Clothing: Footwear, Pants, Jacket/Coat  Other Valuables: None             PATIENT INSTRUCTIONS:    After general anesthesia or intravenous sedation, for 24 hours or while taking prescription Narcotics:  Limit your activities  Do not drive and operate hazardous machinery  Do not make important personal or business decisions  Do  not drink alcoholic beverages  If you have not urinated within 8 hours after discharge, please contact your surgeon on call. Report the following to your surgeon:  Excessive pain, swelling, redness or odor of or around the surgical area  Temperature over 100.5  Nausea and vomiting lasting longer than 4 hours or if unable to take medications  Any signs of decreased circulation or nerve impairment to extremity: change in color, persistent  numbness, tingling, coldness or increase pain  Any questions        What to do at Home:  Recommended activity: Activity as tolerated,     *  Please give a list of your current medications to your Primary Care Provider.     *  Please update this list whenever your medications are discontinued, doses are      changed, or new medications (including over-the-counter products) are added. *  Please carry medication information at all times in case of emergency situations. These are general instructions for a healthy lifestyle:    No smoking/ No tobacco products/ Avoid exposure to second hand smoke    Surgeon General's Warning:  Quitting smoking now greatly reduces serious risk to your health. Obesity, smoking, and sedentary lifestyle greatly increases your risk for illness    A healthy diet, regular physical exercise & weight monitoring are important for maintaining a healthy lifestyle    You may be retaining fluid if you have a history of heart failure or if you experience any of the following symptoms:  Weight gain of 3 pounds or more overnight or 5 pounds in a week, increased swelling in our hands or feet or shortness of breath while lying flat in bed. Please call your doctor as soon as you notice any of these symptoms; do not wait until your next office visit. Recognize signs and symptoms of STROKE:    F-face looks uneven    A-arms unable to move or move unevenly    S-speech slurred or non-existent    T-time-call 911 as soon as signs and symptoms begin-DO NOT go       Back to bed or wait to see if you get better-TIME IS BRAIN. Warning Signs of HEART ATTACK     Call 911 if you have these symptoms:  Chest discomfort. Most heart attacks involve discomfort in the center of the chest that lasts more than a few minutes, or that goes away and comes back. It can feel like uncomfortable pressure, squeezing, fullness, or pain. Discomfort in other areas of the upper body. Symptoms can include pain or discomfort in one or both arms, the back, neck, jaw, or stomach. Shortness of breath with or without chest discomfort. Other signs may include breaking out in a cold sweat, nausea, or lightheadedness. Don't wait more than five minutes to call 911 - MINUTES MATTER! Fast action can save your life.  Calling 911 is almost always the fastest way to get lifesaving treatment. Emergency Medical Services staff can begin treatment when they arrive -- up to an hour sooner than if someone gets to the hospital by car. The discharge information has been reviewed with the patient. The patient verbalized understanding. Discharge medications reviewed with the patient and appropriate educational materials and side effects teaching were provided.

## 2017-03-30 NOTE — PROGRESS NOTES
[address] March 30, 2017       RE: Dany Ferris      To Whom It May Concern,    This is to certify that Dany Ferris was in the hospital under our care from 3/28/17-3/30/17. Please feel free to contact my office if you have any questions or concerns. Thank you for your assistance in this matter.       Sincerely,  Louisa Hendrickson RN per Dr. Crane Mick

## 2017-03-30 NOTE — PROGRESS NOTES
Discharge instructions and prescriptions provided and explained to the pt. Med side effect sheet reviewed. Opportunity for questions provided. Pt getting dressed. Instructed to call once ready to leave.

## 2017-03-30 NOTE — DISCHARGE SUMMARY
Hospitalist Discharge Summary     Patient ID:  Nick Weir  [id number]  89 y.o.  1975  Admit date: 3/28/2017  3:47 PM  Discharge date and time: 3/30/2017  Attending: Inga Milton MD  PCP:  Jose F Aguirre MD  Treatment Team: Attending Provider: Inga Milton MD; Care Manager: Tanja Virk RN; Utilization Review: Gayatri Jorgensen RN    Principal Diagnosis Sepsis [facility])   Principal Problem:    Sepsis ([facility] [address] (3/28/2017)    Active Problems:    Sinus tachycardia (3/28/2017)      Near syncope (3/28/2017)      Bronchiectasis with acute lower respiratory infection ([facility] [address] (3/28/2017)     HPI: 42year old Shanthi Flores was brought to the Ed by  because of recurrent dizziness over the last 3 days and   2 episodes of syncopy today-one at work and the second one at her doctor's office. That was associated with nausea and   Vomiting. When she was evaluated in the Ed she has normal BP but she was tachycardic with low grade fever. CBC showed severe Leukocytosis with   Left shift. Her PMH could explain this. She is an unfortunate,never smoked ,never drank female who had second hand smoking from both parents  In childhood and ended up with Bronchiectasis in the right middle lobe. She does have recurrent exacerbations,last one being few weeks back. She has other significant medical History-DVT/PE,Endometriosis s/p endometrial ablation and still on progesterone,Polycystic ovaries for which she is on   Metformin. Hospital Course:  Please refer to the admission H&P for details of presentation. In summary, the patient presented with dizziness, fevers, tachycardia. Started on levaquin and zithromax on admission. Fevers, leukocytosis resolved. Pt ambulating without complication. BC have remained negative and cxr without acute abnormalities. Pt has hx of bronchiectasis and is on chronic abx with recurrent flares per pt. Pt needs a new pulmonologist to see as her prior doctor has retired. Discussed with pulmonary who are arranging outpatient follow up. Will give additional 6 days of levaquin and have pt resume schedule of abx. Follow up with pcp         Labs: Results:       Chemistry Recent Labs      03/28/17   1830   GLU  101*   NA  140   K  4.0   CL  108*   CO2  26   BUN  16   CREA  0.90   CA  7.9*   AGAP  6*   AP  65   TP  7.0   ALB  3.4*   GLOB  3.6*   AGRAT  0.9*      CBC w/Diff Recent Labs      03/30/17   0539  03/28/17   1347   WBC  4.7  19.0*   RBC  3.92*  5.01   HGB  12.4  16.4*   HCT  38.3  49.1*   PLT  303  420   GRANS   --   93*   LYMPH   --   4*   EOS   --   1      Cardiac Enzymes No results for input(s): CPK, CKND1, ISABEL in the last 72 hours. No lab exists for component: CKRMB, TROIP   Coagulation No results for input(s): PTP, INR, APTT in the last 72 hours. No lab exists for component: INREXT    Lipid Panel Lab Results   Component Value Date/Time    Cholesterol, total 177 06/16/2016 03:19 PM    HDL Cholesterol 51 06/16/2016 03:19 PM    LDL, calculated 102 06/16/2016 03:19 PM    VLDL, calculated 24 06/16/2016 03:19 PM    Triglyceride 118 06/16/2016 03:19 PM      BNP No results for input(s): BNPP in the last 72 hours. Liver Enzymes Recent Labs      03/28/17   1830   TP  7.0   ALB  3.4*   AP  65   SGOT  37      Thyroid Studies No results found for: T4, T3U, TSH, TSHEXT         Discharge Exam:  Visit Vitals    /60 (BP 1 Location: Left arm, BP Patient Position: At rest)    Pulse 83    Temp 98 °F (36.7 °C)    Resp 18    Ht 5' 3\" (1.6 m)    Wt 117.2 kg (258 lb 4.8 oz)    LMP  (LMP Unknown)    SpO2 98%    Breastfeeding No    BMI 45.76 kg/m2     General appearance: alert, cooperative, no distress, appears stated age  Lungs: clear to auscultation bilaterally  Heart: regular rate and rhythm, S1, S2 normal, no murmur, click, rub or gallop  Abdomen: soft, non-tender.  Bowel sounds normal. No masses,  no organomegaly  Extremities: no cyanosis or edema  Neurologic: Grossly normal    Disposition: home  Discharge Condition: stable  Patient Instructions:   Current Discharge Medication List      CONTINUE these medications which have CHANGED    Details   levoFLOXacin (LEVAQUIN) 750 mg tablet Take 1 Tab by mouth daily for 6 days. Qty: 6 Tab, Refills: 0         CONTINUE these medications which have NOT CHANGED    Details   fluticasone-salmeterol (ADVAIR DISKUS) 250-50 mcg/dose diskus inhaler Take 1 Puff by inhalation two (2) times a day. Qty: 1 Inhaler, Refills: 11      albuterol (PROAIR HFA) 90 mcg/actuation inhaler Take 2 Puffs by inhalation every four (4) hours as needed. Qty: 1 Inhaler, Refills: 11      LORazepam (ATIVAN) 0.5 mg tablet Take 1 Tab by mouth two (2) times daily as needed for Anxiety. Max Daily Amount: 1 mg. Qty: 60 Tab, Refills: 3    Associated Diagnoses: Anxiety and depression      hydroCHLOROthiazide (HYDRODIURIL) 12.5 mg tablet Take 1 Tab by mouth daily. Qty: 30 Tab, Refills: 12    Associated Diagnoses: Peripheral edema      norethindrone (MICRONOR) 0.35 mg tab Take 0.35 mg by mouth daily. metFORMIN ER (GLUCOPHAGE XR) 750 mg tablet Take 750 mg by mouth daily. multivitamin (ONE A DAY) tablet Take 1 Tab by mouth daily. aspirin 81 mg tablet Take 81 mg by mouth.       doxycycline (ADOXA) 100 mg tablet Take 1 tab daily for 1 week every third month  Qty: 7 Tab, Refills: 5    Associated Diagnoses: Bronchiectasis without complication (HCC)         STOP taking these medications       albuterol (PROVENTIL VENTOLIN) 2.5 mg /3 mL (0.083 %) nebulizer solution Comments:   Reason for Stopping:         azithromycin (ZITHROMAX) 500 mg tab Comments:   Reason for Stopping:         guaiFENesin ER (MUCINEX) 600 mg ER tablet Comments:   Reason for Stopping:               Activity: Activity as tolerated  Diet: Regular Diet  Wound Care: None needed    Follow-up  ·   With pcp, pulm  Time spent to discharge patient 35 minutes  Signed:  Waldemar Krishnamurthy MD  3/30/2017  9:03 AM

## 2017-04-02 LAB
BACTERIA SPEC CULT: NORMAL
BACTERIA SPEC CULT: NORMAL
SERVICE CMNT-IMP: NORMAL
SERVICE CMNT-IMP: NORMAL

## 2017-04-05 ENCOUNTER — PATIENT OUTREACH (OUTPATIENT)
Dept: CASE MANAGEMENT | Age: 42
End: 2017-04-05

## 2017-04-05 NOTE — PROGRESS NOTES
Patient will need [employer] at home. Customer [employer]#[phone]. Order for home [address] and office notes faxed to [phone].     I will update Ashley Dao when we get approval.

## 2017-04-11 ENCOUNTER — HOSPITAL ENCOUNTER (OUTPATIENT)
Dept: CT IMAGING | Age: 42
Discharge: HOME | End: 2017-04-11
Payer: COMMERCIAL

## 2017-04-11 DIAGNOSIS — R91.1 LUNG NODULE: ICD-10-CM

## 2017-04-11 PROCEDURE — 71250 CT THORAX DX C-: CPT

## 2017-04-16 ENCOUNTER — APPOINTMENT (OUTPATIENT)
Dept: GENERAL RADIOLOGY | Age: 42
DRG: 192 | End: 2017-04-16
Attending: EMERGENCY MEDICINE
Payer: COMMERCIAL

## 2017-04-16 ENCOUNTER — HOSPITAL ENCOUNTER (INPATIENT)
Age: 42
LOS: 2 days | Discharge: HOME | DRG: 192 | End: 2017-04-20
Attending: EMERGENCY MEDICINE | Admitting: INTERNAL MEDICINE
Payer: COMMERCIAL

## 2017-04-16 DIAGNOSIS — J09.X2 INFLUENZA A (H5N1): ICD-10-CM

## 2017-04-16 DIAGNOSIS — J47.9 BRONCHIECTASIS WITHOUT COMPLICATION (HCC): Chronic | ICD-10-CM

## 2017-04-16 DIAGNOSIS — R29.818 SUSPECTED SLEEP APNEA: ICD-10-CM

## 2017-04-16 DIAGNOSIS — J47.1 BRONCHIECTASIS WITH (ACUTE) EXACERBATION (HCC): ICD-10-CM

## 2017-04-16 DIAGNOSIS — E11.9 CONTROLLED TYPE 2 DIABETES MELLITUS WITHOUT COMPLICATION, WITHOUT LONG-TERM CURRENT USE OF INSULIN (HCC): Chronic | ICD-10-CM

## 2017-04-16 DIAGNOSIS — E66.01 MORBID OBESITY DUE TO EXCESS CALORIES (HCC): Chronic | ICD-10-CM

## 2017-04-16 DIAGNOSIS — J45.40 MODERATE PERSISTENT ASTHMA WITHOUT COMPLICATION: Chronic | ICD-10-CM

## 2017-04-16 DIAGNOSIS — R50.9 FEVER, UNSPECIFIED FEVER CAUSE: ICD-10-CM

## 2017-04-16 PROBLEM — A41.9 SEPSIS, UNSPECIFIED: Status: RESOLVED | Noted: 2017-03-28 | Resolved: 2017-04-16

## 2017-04-16 PROBLEM — J47.0 BRONCHIECTASIS WITH ACUTE LOWER RESPIRATORY INFECTION (HCC): Status: RESOLVED | Noted: 2017-03-28 | Resolved: 2017-04-16

## 2017-04-16 LAB
ALBUMIN SERPL BCP-MCNC: 3.9 G/DL (ref 3.5–5)
ALBUMIN/GLOB SERPL: 1 {RATIO} (ref 1.2–3.5)
ALP SERPL-CCNC: 110 U/L (ref 50–136)
ALT SERPL-CCNC: 65 U/L (ref 12–65)
ANION GAP BLD CALC-SCNC: 9 MMOL/L (ref 7–16)
AST SERPL W P-5'-P-CCNC: 46 U/L (ref 15–37)
BACTERIA URNS QL MICRO: 0 /HPF
BASOPHILS # BLD AUTO: 0 K/UL (ref 0–0.2)
BASOPHILS # BLD: 0 % (ref 0–2)
BILIRUB SERPL-MCNC: 0.4 MG/DL (ref 0.2–1.1)
BUN SERPL-MCNC: 10 MG/DL (ref 6–23)
CALCIUM SERPL-MCNC: 9 MG/DL (ref 8.3–10.4)
CASTS URNS QL MICRO: NORMAL /LPF
CHLORIDE SERPL-SCNC: 107 MMOL/L (ref 98–107)
CO2 SERPL-SCNC: 26 MMOL/L (ref 21–32)
CREAT SERPL-MCNC: 0.88 MG/DL (ref 0.6–1)
DIFFERENTIAL METHOD BLD: ABNORMAL
EOSINOPHIL # BLD: 0 K/UL (ref 0–0.8)
EOSINOPHIL NFR BLD: 1 % (ref 0.5–7.8)
EPI CELLS #/AREA URNS HPF: NORMAL /HPF
ERYTHROCYTE [DISTWIDTH] IN BLOOD BY AUTOMATED COUNT: 14.6 % (ref 11.9–14.6)
FLUAV AG NPH QL IA: NEGATIVE
FLUBV AG NPH QL IA: NEGATIVE
GLOBULIN SER CALC-MCNC: 4 G/DL (ref 2.3–3.5)
GLUCOSE BLD STRIP.AUTO-MCNC: 144 MG/DL (ref 65–100)
GLUCOSE SERPL-MCNC: 116 MG/DL (ref 65–100)
HCG UR QL: NEGATIVE
HCT VFR BLD AUTO: 42.8 % (ref 35.8–46.3)
HGB BLD-MCNC: 14.8 G/DL (ref 11.7–15.4)
IGG SERPL-MCNC: 797 MG/DL (ref 700–1600)
IMM GRANULOCYTES # BLD: 0 K/UL (ref 0–0.5)
IMM GRANULOCYTES NFR BLD AUTO: 0.4 % (ref 0–5)
LACTATE BLD-SCNC: 1.6 MMOL/L (ref 0.5–1.9)
LYMPHOCYTES # BLD AUTO: 15 % (ref 13–44)
LYMPHOCYTES # BLD: 0.8 K/UL (ref 0.5–4.6)
MCH RBC QN AUTO: 32.7 PG (ref 26.1–32.9)
MCHC RBC AUTO-ENTMCNC: 34.6 G/DL (ref 31.4–35)
MCV RBC AUTO: 94.5 FL (ref 79.6–97.8)
MONOCYTES # BLD: 0.8 K/UL (ref 0.1–1.3)
MONOCYTES NFR BLD AUTO: 16 % (ref 4–12)
NEUTS SEG # BLD: 3.5 K/UL (ref 1.7–8.2)
NEUTS SEG NFR BLD AUTO: 68 % (ref 43–78)
PLATELET # BLD AUTO: 305 K/UL (ref 150–450)
PMV BLD AUTO: 10.2 FL (ref 10.8–14.1)
POTASSIUM SERPL-SCNC: 3.5 MMOL/L (ref 3.5–5.1)
PROT SERPL-MCNC: 7.9 G/DL (ref 6.3–8.2)
RBC # BLD AUTO: 4.53 M/UL (ref 4.05–5.25)
RBC #/AREA URNS HPF: NORMAL /HPF
SODIUM SERPL-SCNC: 142 MMOL/L (ref 136–145)
WBC # BLD AUTO: 5.2 K/UL (ref 4.3–11.1)
WBC URNS QL MICRO: NORMAL /HPF

## 2017-04-16 PROCEDURE — 74011000250 HC RX REV CODE- 250: Performed by: INTERNAL MEDICINE

## 2017-04-16 PROCEDURE — 99218 HC RM OBSERVATION: CPT

## 2017-04-16 PROCEDURE — 83605 ASSAY OF LACTIC ACID: CPT

## 2017-04-16 PROCEDURE — 82962 GLUCOSE BLOOD TEST: CPT

## 2017-04-16 PROCEDURE — 71020 XR CHEST PA LAT: CPT

## 2017-04-16 PROCEDURE — 74011250637 HC RX REV CODE- 250/637: Performed by: INTERNAL MEDICINE

## 2017-04-16 PROCEDURE — 87804 INFLUENZA ASSAY W/OPTIC: CPT | Performed by: EMERGENCY MEDICINE

## 2017-04-16 PROCEDURE — 82787 IGG 1 2 3 OR 4 EACH: CPT | Performed by: INTERNAL MEDICINE

## 2017-04-16 PROCEDURE — 82784 ASSAY IGA/IGD/IGG/IGM EACH: CPT | Performed by: INTERNAL MEDICINE

## 2017-04-16 PROCEDURE — 94640 AIRWAY INHALATION TREATMENT: CPT

## 2017-04-16 PROCEDURE — 96360 HYDRATION IV INFUSION INIT: CPT | Performed by: EMERGENCY MEDICINE

## 2017-04-16 PROCEDURE — 74011250636 HC RX REV CODE- 250/636: Performed by: EMERGENCY MEDICINE

## 2017-04-16 PROCEDURE — 93005 ELECTROCARDIOGRAM TRACING: CPT | Performed by: EMERGENCY MEDICINE

## 2017-04-16 PROCEDURE — 81003 URINALYSIS AUTO W/O SCOPE: CPT | Performed by: EMERGENCY MEDICINE

## 2017-04-16 PROCEDURE — 85025 COMPLETE CBC W/AUTO DIFF WBC: CPT | Performed by: EMERGENCY MEDICINE

## 2017-04-16 PROCEDURE — 80053 COMPREHEN METABOLIC PANEL: CPT | Performed by: EMERGENCY MEDICINE

## 2017-04-16 PROCEDURE — 99285 EMERGENCY DEPT VISIT HI MDM: CPT | Performed by: EMERGENCY MEDICINE

## 2017-04-16 PROCEDURE — 87070 CULTURE OTHR SPECIMN AEROBIC: CPT | Performed by: INTERNAL MEDICINE

## 2017-04-16 PROCEDURE — 96361 HYDRATE IV INFUSION ADD-ON: CPT | Performed by: EMERGENCY MEDICINE

## 2017-04-16 PROCEDURE — 74011250636 HC RX REV CODE- 250/636: Performed by: INTERNAL MEDICINE

## 2017-04-16 PROCEDURE — 99223 1ST HOSP IP/OBS HIGH 75: CPT | Performed by: INTERNAL MEDICINE

## 2017-04-16 PROCEDURE — 36415 COLL VENOUS BLD VENIPUNCTURE: CPT | Performed by: INTERNAL MEDICINE

## 2017-04-16 PROCEDURE — 87116 MYCOBACTERIA CULTURE: CPT | Performed by: INTERNAL MEDICINE

## 2017-04-16 PROCEDURE — 74011250637 HC RX REV CODE- 250/637: Performed by: EMERGENCY MEDICINE

## 2017-04-16 PROCEDURE — 81015 MICROSCOPIC EXAM OF URINE: CPT | Performed by: EMERGENCY MEDICINE

## 2017-04-16 PROCEDURE — 81025 URINE PREGNANCY TEST: CPT

## 2017-04-16 RX ORDER — SODIUM CHLORIDE 0.9 % (FLUSH) 0.9 %
5-10 SYRINGE (ML) INJECTION AS NEEDED
Status: DISCONTINUED | OUTPATIENT
Start: 2017-04-16 | End: 2017-04-19 | Stop reason: SDUPTHER

## 2017-04-16 RX ORDER — PREDNISONE 20 MG/1
40 TABLET ORAL
Status: DISCONTINUED | OUTPATIENT
Start: 2017-04-17 | End: 2017-04-18

## 2017-04-16 RX ORDER — SODIUM CHLORIDE 0.9 % (FLUSH) 0.9 %
5-10 SYRINGE (ML) INJECTION EVERY 8 HOURS
Status: DISCONTINUED | OUTPATIENT
Start: 2017-04-16 | End: 2017-04-18 | Stop reason: SDUPTHER

## 2017-04-16 RX ORDER — HYDROCODONE BITARTRATE AND HOMATROPINE METHYLBROMIDE 1.5; 5 MG/5ML; MG/5ML
5 SYRUP ORAL
Status: DISCONTINUED | OUTPATIENT
Start: 2017-04-16 | End: 2017-04-20 | Stop reason: HOSPADM

## 2017-04-16 RX ORDER — SERTRALINE HYDROCHLORIDE 50 MG/1
50 TABLET, FILM COATED ORAL DAILY
Status: DISCONTINUED | OUTPATIENT
Start: 2017-04-17 | End: 2017-04-20 | Stop reason: HOSPADM

## 2017-04-16 RX ORDER — ASPIRIN 81 MG/1
81 TABLET ORAL DAILY
Status: DISCONTINUED | OUTPATIENT
Start: 2017-04-17 | End: 2017-04-20 | Stop reason: HOSPADM

## 2017-04-16 RX ORDER — LEVOFLOXACIN 5 MG/ML
750 INJECTION, SOLUTION INTRAVENOUS EVERY 24 HOURS
Status: DISCONTINUED | OUTPATIENT
Start: 2017-04-16 | End: 2017-04-19

## 2017-04-16 RX ORDER — HYDROCHLOROTHIAZIDE 25 MG/1
12.5 TABLET ORAL DAILY
Status: DISCONTINUED | OUTPATIENT
Start: 2017-04-17 | End: 2017-04-20 | Stop reason: HOSPADM

## 2017-04-16 RX ORDER — ALBUTEROL SULFATE 0.83 MG/ML
2.5 SOLUTION RESPIRATORY (INHALATION)
Status: DISCONTINUED | OUTPATIENT
Start: 2017-04-16 | End: 2017-04-20 | Stop reason: HOSPADM

## 2017-04-16 RX ORDER — GUAIFENESIN 600 MG/1
600 TABLET, EXTENDED RELEASE ORAL 2 TIMES DAILY
Status: DISCONTINUED | OUTPATIENT
Start: 2017-04-16 | End: 2017-04-17

## 2017-04-16 RX ORDER — BUDESONIDE 0.5 MG/2ML
500 INHALANT ORAL
Status: DISCONTINUED | OUTPATIENT
Start: 2017-04-16 | End: 2017-04-20 | Stop reason: HOSPADM

## 2017-04-16 RX ORDER — ACETAMINOPHEN 500 MG
1000 TABLET ORAL
Status: COMPLETED | OUTPATIENT
Start: 2017-04-16 | End: 2017-04-16

## 2017-04-16 RX ORDER — CLONAZEPAM 1 MG/1
1 TABLET ORAL
Status: DISCONTINUED | OUTPATIENT
Start: 2017-04-16 | End: 2017-04-17

## 2017-04-16 RX ORDER — INSULIN LISPRO 100 [IU]/ML
INJECTION, SOLUTION INTRAVENOUS; SUBCUTANEOUS
Status: DISCONTINUED | OUTPATIENT
Start: 2017-04-16 | End: 2017-04-20 | Stop reason: HOSPADM

## 2017-04-16 RX ORDER — ENOXAPARIN SODIUM 100 MG/ML
40 INJECTION SUBCUTANEOUS EVERY 12 HOURS
Status: DISCONTINUED | OUTPATIENT
Start: 2017-04-16 | End: 2017-04-20 | Stop reason: HOSPADM

## 2017-04-16 RX ORDER — ACETAMINOPHEN 325 MG/1
650 TABLET ORAL
Status: DISCONTINUED | OUTPATIENT
Start: 2017-04-16 | End: 2017-04-20 | Stop reason: HOSPADM

## 2017-04-16 RX ADMIN — ALBUTEROL SULFATE 2.5 MG: 2.5 SOLUTION RESPIRATORY (INHALATION) at 20:22

## 2017-04-16 RX ADMIN — ACETAMINOPHEN 1000 MG: 500 TABLET ORAL at 17:23

## 2017-04-16 RX ADMIN — HYDROCODONE BITARTRATE AND HOMATROPINE METHYLBROMIDE 5 ML: 5; 1.5 SOLUTION ORAL at 20:50

## 2017-04-16 RX ADMIN — Medication 10 ML: at 20:19

## 2017-04-16 RX ADMIN — BUDESONIDE 500 MCG: 0.5 INHALANT RESPIRATORY (INHALATION) at 20:22

## 2017-04-16 RX ADMIN — ACETAMINOPHEN 650 MG: 325 TABLET ORAL at 20:49

## 2017-04-16 RX ADMIN — GUAIFENESIN 600 MG: 600 TABLET, EXTENDED RELEASE ORAL at 20:50

## 2017-04-16 RX ADMIN — SODIUM CHLORIDE 1000 ML: 900 INJECTION, SOLUTION INTRAVENOUS at 17:07

## 2017-04-16 RX ADMIN — LEVOFLOXACIN 750 MG: 5 INJECTION, SOLUTION INTRAVENOUS at 20:22

## 2017-04-16 RX ADMIN — ENOXAPARIN SODIUM 40 MG: 40 INJECTION SUBCUTANEOUS at 20:26

## 2017-04-16 NOTE — ED PROVIDER NOTES
HPI Comments: Presents with complaint of cough and fever and shortness of breath and change in her sputum color from yellowish to green which has been going on since her discharge. She was seen at the pulmonary clinic and the same symptoms. She reports she called the pulmonary clinic and was told to come here. The  states I should call the pulmonary doctor because she needs to be admitted. She took 2 Advil Prior to arrival and feels like her fevers currently drinking. She is taking clarithromycin and Mucinex and doesn't feel that there is enough. She has a history of bronchiectasis and 10 years ago. He said And it was similar to her current situation and that she needed IV antibiotics because without them she passes out from her fever. Report she had a CT recently. Patient is a 42 y.o. female presenting with cough and fever. The history is provided by the patient and the spouse. Cough   This is a chronic problem. The current episode started more than 1 week ago. The problem occurs constantly. The problem has not changed since onset. Cough characteristics: yellow to green. There has been a fever of 102 - 102.9 F. Associated symptoms include shortness of breath and wheezing. Pertinent negatives include no chest pain, no chills, no sweats, no nausea and no vomiting. She has tried antibiotics for the symptoms. The treatment provided no relief. She is not a smoker. Her past medical history is significant for pneumonia and bronchiectasis. Fever    Associated symptoms include cough and shortness of breath. Pertinent negatives include no chest pain and no vomiting. Past Medical History:   Diagnosis Date    Recurrent pneumonia [id number]    The patient reports having been hospitalized multiple times with recurrent pneumonias during a 2 year period.  Thromboembolism ([address] 8515-7150    During one of the patient's hospitalizations, she developed deep vein thrombosis and pulmonary emboli.   She took Coumadin for approximately 2 years. Past Surgical History:   Procedure Laterality Date    ENDOMETRIAL CRYOABLATION      HX CHOLECYSTECTOMY  1994    for stones    HX HEENT      T & A    HX MYOMECTOMY      ablasion and polyp    HX TONSIL AND ADENOIDECTOMY  1981         Family History:   Problem Relation Age of Onset    COPD Mother     Cancer Mother      Lung    Breast Cancer Mother 54    Diabetes Father     Heart Disease Father     Heart Disease Sister     Hypertension Brother        Social History     Social History    Marital status:      Spouse name: N/A    Number of children: N/A    Years of education: N/A     Occupational History          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 She does not use alcohol. She had considerable secondhand exposure to cigarette smoke as a child from both of her parents who smoked. There is no significant environmental or industrial exposure. She has no known exposure to TB. She has always lived in this area. She has no indoor pets or birds. She is  and living with her . ALLERGIES: Keflex [cephalexin]; Vancomycin; Penicillins; Sulfa (sulfonamide antibiotics); Sulfamethoxazole; and Prednisone    Review of Systems   Constitutional: Positive for fever. Negative for chills. Respiratory: Positive for cough, shortness of breath and wheezing. Cardiovascular: Negative for chest pain. Gastrointestinal: Negative for nausea and vomiting. All other systems reviewed and are negative.       Vitals:    04/16/17 1647   BP: 129/62   Pulse: (!) 122   Resp: 22   Temp: 99.8 °F (37.7 °C)   SpO2: 97%   Weight: 108.9 kg (240 lb)   Height: 5' 3\" (1.6 m)            Physical Exam   Constitutional: She is oriented to person, place, and time. She appears well-developed and well-nourished. No distress. HENT:   Head: Normocephalic and atraumatic. Neck: Normal range of motion. Neck supple. Cardiovascular: Regular rhythm. Tachycardia present. Pulmonary/Chest: Effort normal. No respiratory distress. Occasional crackles similar to pulmonary fibrosis on the left upper   Abdominal: Soft. She exhibits no distension. There is no tenderness. There is no rebound. Musculoskeletal: Normal range of motion. She exhibits no edema. Neurological: She is alert and oriented to person, place, and time. Skin: Skin is warm and dry. She is not diaphoretic. Nursing note and vitals reviewed. MDM  Number of Diagnoses or Management Options  Diagnosis management comments: I had reviewed Dr. Josselyn Mcdonald note from her call prior to arrival and he requested that she be seen tomorrow in clinic and he Would call some other medicine in for her. She reports her symptoms are not much change from her discharge. I attempted to explain to them that based on her vital signs her white count and her normal lactic and her recent neg CT she did not need to be admitted to the hospital.  This was unsatisfactory to pt and  so I asked Dr. Barker General see the patient.        Amount and/or Complexity of Data Reviewed  Clinical lab tests: ordered and reviewed  Discuss the patient with other providers: yes    Risk of Complications, Morbidity, and/or Mortality  Presenting problems: high  Diagnostic procedures: moderate  Management options: high    Patient Progress  Patient progress: improved    ED Course       Procedures

## 2017-04-16 NOTE — PROGRESS NOTES
TRANSFER - IN REPORT:    Verbal report received from ED RN(name) [facility]  being received from ED(unit) for routine progression of care      Report consisted of patients Situation, Background, Assessment and   Recommendations(SBAR). Information from the following report(s) ED Summary was reviewed with the receiving nurse. Opportunity for questions and clarification was provided. Assessment completed upon patients arrival to unit and care assumed.

## 2017-04-16 NOTE — IP AVS SNAPSHOT
Current Discharge Medication List  
  
START taking these medications Dose & Instructions Dispensing Information Comments Morning Noon Evening Bedtime  
 predniSONE 10 mg tablet Commonly known as:  Jacqueline Arellano Your last dose was:  4/20 Your next dose is:  4/21 Dose:  10 mg Take 1 Tab by mouth daily. Take 30 mg (3 tablets) per day for 3 days, 20 mg (2 tablets) per day for 3 days then 10 mg (1 tablet) per day for 3 days then stop Quantity:  18 Tab Refills:  0 CONTINUE these medications which have CHANGED Dose & Instructions Dispensing Information Comments Morning Noon Evening Bedtime * albuterol 90 mcg/actuation inhaler Commonly known as:  PROAIR HFA What changed:  Another medication with the same name was changed. Make sure you understand how and when to take each. Dose:  2 Puff Take 2 Puffs by inhalation every four (4) hours as needed. Quantity:  1 Inhaler Refills:  11  
     
   
   
   
  
 * albuterol 2.5 mg /3 mL (0.083 %) nebulizer solution Commonly known as:  PROVENTIL VENTOLIN What changed:  See the new instructions. Your last dose was:  4/20 at 11 AM  
Your next dose is: This afternoon and evening 4/20 Dose:  2.5 mg  
3 mL by Nebulization route four (4) times daily. Quantity:  24 Each Refills:  11  
     
  
   
  
   
  
   
  
  
 guaiFENesin  mg ER tablet Commonly known as:  [address] What changed:  how much to take Your last dose was:  4/20 AM  
Your next dose is: This evening 4/20 Dose:  1200 mg Take 2 Tabs by mouth two (2) times a day. Quantity:  1 Tab Refills:  0  
     
  
   
   
  
   
  
 * Notice: This list has 2 medication(s) that are the same as other medications prescribed for you. Read the directions carefully, and ask your doctor or other care provider to review them with you. CONTINUE these medications which have NOT CHANGED Dose & Instructions Dispensing Information Comments Morning Noon Evening Bedtime  
 aspirin 81 mg tablet Your last dose was:  4/20 Your next dose is:  4/21 Dose:  81 mg Take 81 mg by mouth. Refills:  0  
     
   
   
   
  
 clonazePAM 1 mg tablet Commonly known as:  Britni Hair Dose:  1 mg Take 1 Tab by mouth nightly as needed. Max Daily Amount: 1 mg. Quantity:  30 Tab Refills:  1  
     
   
   
   
  
 fluticasone-salmeterol 250-50 mcg/dose diskus inhaler Commonly known as:  ADVAIR DISKUS Your next dose is: This evening 4/20 Dose:  1 Puff Take 1 Puff by inhalation two (2) times a day. Quantity:  1 Inhaler Refills:  11  
     
  
   
   
  
   
  
 hydroCHLOROthiazide 12.5 mg tablet Commonly known as:  HYDRODIURIL Your last dose was:  4/20 Your next dose is:  4/21 Dose:  12.5 mg Take 1 Tab by mouth daily. Quantity:  30 Tab Refills:  12  
     
  
   
   
   
  
 multivitamin tablet Commonly known as:  ONE A DAY Your next dose is:  4/21 Dose:  1 Tab Take 1 Tab by mouth daily. Refills:  0  
     
  
   
   
   
  
 ondansetron hcl 8 mg tablet Commonly known as:  ZOFRAN (AS HYDROCHLORIDE) Dose:  8 mg Take 1 Tab by mouth every eight (8) hours as needed for Nausea. Quantity:  15 Tab Refills:  0  
     
   
   
   
  
 sertraline 50 mg tablet Commonly known as:  ZOLOFT Your last dose was:  4/20 Your next dose is:  4/21 Dose:  50 mg Take 1 Tab by mouth daily. Quantity:  30 Tab Refills:  6 STOP taking these medications   
 clindamycin 300 mg capsule Commonly known as:  CLEOCIN Where to Get Your Medications These medications were sent to [address] [place] [address], [facility] ([address], [address] [facility] Phone:  [phone] albuterol 2.5 mg /3 mL (0.083 %) nebulizer solution Information on where to get these meds will be given to you by the nurse or doctor. ! Ask your nurse or doctor about these medications  
  guaiFENesin  mg ER tablet  
 predniSONE 10 mg tablet

## 2017-04-16 NOTE — IP AVS SNAPSHOT
Emma Zaragoza 
 
 
 [address] [facility] [address] [facility] 
[phone] Patient: Romain Meza MRN: [id number] NYI:[birth year]10 You are allergic to the following Allergen Reactions Keflex (Cephalexin) Anaphylaxis Vancomycin Anaphylaxis Penicillins Anaphylaxis Sulfa (Sulfonamide Antibiotics) Anaphylaxis Sulfamethoxazole Other (comments) Wheezing Prednisone Swelling Face turns red, nervousness Recent Documentation Height Weight BMI OB Status Smoking Status 1.6 m 108.9 kg 42.51 kg/m2 Ablation Never Smoker Unresulted Labs Order Current Status AFB CULTURE + SMEAR W/RFLX ID FROM CULTURE Preliminary result AFB CULTURE + SMEAR W/RFLX ID FROM CULTURE Preliminary result FUNGUS, CULTURE, MISC SOURCE Preliminary result Emergency Contacts Name Discharge Info Relation Home Work Mobile [address] CAREGIVER [3] Spouse [3] [phone] About your hospitalization You were admitted on:  April 16, 2017 You last received care in the:  [facility] 7 MED SURG You were discharged on:  April 20, 2017 Unit phone number:  [phone] Why you were hospitalized Your primary diagnosis was:  Bronchiectasis With (Acute) Exacerbation Your diagnoses also included:  Fever, Diabetes Mellitus Type 2, Controlled (Hcc), Anxiety And Depression, Morbid Obesity (Hcc), Sinus Tachycardia, Lung Nodule < 6cm On Ct, Suspected Sleep Apnea, Influenza A (H5n1) Providers Seen During Your Hospitalizations Provider Role Specialty Primary office phone Caio Thompson MD Attending Provider Emergency Medicine [phone] Magdalene Patiño MD Attending Provider Pulmonary Disease [phone] Your Primary Care Physician (PCP) Primary Care Physician Office Phone Office Fax Florencia De La Cruz [phone] Follow-up Information Follow up With Details Comments Contact Info Silviano Narvaez MD   [address] [facility] [address] [facility] [address] 
[phone] Sandoval Campos MD On 5/2/2017 follow up appointment at 10 00  with Jackelin Angel  [address]   
[address] [facility] [address] 
[phone] [facility] to call to set up sleep study [phone] Your Appointments Tuesday May 02, 2017 10:20 AM EDT  
(Arrive by 10:00 AM) H[facility] with [facility] ([facility]) [address] [place] [address] [facility]  
[phone] Wednesday May 31, 2017  9:45 AM EDT Follow Up with Silviano Narvaez MD  
[facility] Internal Medicine ([facility] INTERNAL MEDICINE) [address] Shabnam Vega [facility] [address]  
[phone] Current Discharge Medication List  
  
START taking these medications Dose & Instructions Dispensing Information Comments Morning Noon Evening Bedtime  
 predniSONE 10 mg tablet Commonly known as:  Ronita Fairchild Your last dose was:  4/20 Your next dose is:  4/21 Dose:  10 mg Take 1 Tab by mouth daily. Take 30 mg (3 tablets) per day for 3 days, 20 mg (2 tablets) per day for 3 days then 10 mg (1 tablet) per day for 3 days then stop Quantity:  18 Tab Refills:  0 CONTINUE these medications which have CHANGED Dose & Instructions Dispensing Information Comments Morning Noon Evening Bedtime * albuterol 90 mcg/actuation inhaler Commonly known as:  PROAIR HFA What changed:  Another medication with the same name was changed. Make sure you understand how and when to take each. Dose:  2 Puff Take 2 Puffs by inhalation every four (4) hours as needed. Quantity:  1 Inhaler Refills:  11  
     
   
   
   
  
 * albuterol 2.5 mg /3 mL (0.083 %) nebulizer solution Commonly known as:  PROVENTIL VENTOLIN What changed:  See the new instructions. Your last dose was:  4/20 at 11 AM  
Your next dose is: This afternoon and evening 4/20 Dose:  2.5 mg  
3 mL by Nebulization route four (4) times daily. Quantity:  24 Each Refills:  11  
     
  
   
  
   
  
   
  
  
 guaiFENesin  mg ER tablet Commonly known as:  aitainment What changed:  how much to take Your last dose was:  4/20 AM  
Your next dose is: This evening 4/20 Dose:  1200 mg Take 2 Tabs by mouth two (2) times a day. Quantity:  1 Tab Refills:  0  
     
  
   
   
  
   
  
 * Notice: This list has 2 medication(s) that are the same as other medications prescribed for you. Read the directions carefully, and ask your doctor or other care provider to review them with you. CONTINUE these medications which have NOT CHANGED Dose & Instructions Dispensing Information Comments Morning Noon Evening Bedtime  
 aspirin 81 mg tablet Your last dose was:  4/20 Your next dose is:  4/21 Dose:  81 mg Take 81 mg by mouth. Refills:  0  
     
   
   
   
  
 clonazePAM 1 mg tablet Commonly known as:  Damon Less Dose:  1 mg Take 1 Tab by mouth nightly as needed. Max Daily Amount: 1 mg. Quantity:  30 Tab Refills:  1  
     
   
   
   
  
 fluticasone-salmeterol 250-50 mcg/dose diskus inhaler Commonly known as:  ADVAIR DISKUS Your next dose is: This evening 4/20 Dose:  1 Puff Take 1 Puff by inhalation two (2) times a day. Quantity:  1 Inhaler Refills:  11  
     
  
   
   
  
   
  
 hydroCHLOROthiazide 12.5 mg tablet Commonly known as:  HYDRODIURIL Your last dose was:  4/20 Your next dose is:  4/21 Dose:  12.5 mg Take 1 Tab by mouth daily. Quantity:  30 Tab Refills:  12  
     
  
   
   
   
  
 multivitamin tablet Commonly known as:  ONE A DAY Your next dose is:  4/21 Dose:  1 Tab Take 1 Tab by mouth daily. Refills:  0  
     
  
   
   
   
  
 ondansetron hcl 8 mg tablet Commonly known as:  ZOFRAN (AS HYDROCHLORIDE) Dose:  8 mg Take 1 Tab by mouth every eight (8) hours as needed for Nausea. Quantity:  15 Tab Refills:  0  
     
   
   
   
  
 sertraline 50 mg tablet Commonly known as:  ZOLOFT Your last dose was:  4/20 Your next dose is:  4/21 Dose:  50 mg Take 1 Tab by mouth daily. Quantity:  30 Tab Refills:  6 STOP taking these medications   
 clindamycin 300 mg capsule Commonly known as:  CLEOCIN Where to Get Your Medications These medications were sent to [address] [place] [address] A, [place] ([facility]  [address], [address] [facility] Phone:  [phone]  
  albuterol 2.5 mg /3 mL (0.083 %) nebulizer solution Information on where to get these meds will be given to you by the nurse or doctor. ! Ask your nurse or doctor about these medications  
  guaiFENesin  mg ER tablet  
 predniSONE 10 mg tablet Discharge Instructions Activity: Activity as tolerated Diet: Regular Diet Break the smoking habit Enroll in a stop-smoking program to increase your chances of success. Ask your healthcare provider about medicines or other methods to help you quit. Ask family members to quit smoking as well. Dont allow smoking in your home or around you. Other home care Continue using chest vest as prescribed Learn to do controlled breathing. Take your medicine exactly as directed. Dont skip doses or take double doses. Eat a balanced diet. Unless told otherwise, drink at least 8 glasses of fluid every day to thin mucus and secretions. Maintain a healthy weight. Get help to lose any extra pounds. Get a flu shot every year. Ask your healthcare provider about pneumonia vaccinations. Avoid allergens, pollution, and dust. 
Get immediate medical treatment for infections. Consider installing a home air-conditioning system with a filter and humidity control. Follow-up care Make a follow-up appointment with your healthcare provider, as advised. Call 46 Call 911 right away if you have Chest pain Shortness of breath that doesn't get better after taking medicine When to seek medical attention Call your healthcare provider right away if you have; 
Fever of 100.4°F (38°C) or higher, or as directed by your healthcare provider Yellow, green, bloody, or smelly mucus More than normal mucus production Influenza (Flu): Care Instructions Your Care Instructions Influenza (flu) is an infection in the lungs and breathing passages. It is caused by the influenza virus. There are different strains, or types, of the flu virus from year to year. Unlike the common cold, the flu comes on suddenly and the symptoms, such as a cough, congestion, fever, chills, fatigue, aches, and pains, are more severe. These symptoms may last up to 10 days. Although the flu can make you feel very sick, it usually doesn't cause serious health problems. Home treatment is usually all you need for flu symptoms. But your doctor may prescribe antiviral medicine to prevent other health problems, such as pneumonia, from developing. Older people and those who have a long-term health condition, such as lung disease, are most at risk for having pneumonia or other health problems. Follow-up care is a key part of your treatment and safety. Be sure to make and go to all appointments, and call your doctor if you are having problems. Its also a good idea to know your test results and keep a list of the medicines you take. How can you care for yourself at home?  
· Get plenty of rest. 
· Drink plenty of fluids, enough so that your urine is light yellow or clear like water. If you have kidney, heart, or liver disease and have to limit fluids, talk with your doctor before you increase the amount of fluids you drink. · Take an over-the-counter pain medicine if needed, such as acetaminophen (Tylenol), ibuprofen (Advil, Motrin), or naproxen (Aleve), to relieve fever, headache, and muscle aches. Read and follow all instructions on the label. No one younger than 20 should take aspirin. It has been linked to Reye syndrome, a serious illness. · Do not smoke. Smoking can make the flu worse. If you need help quitting, talk to your doctor about stop-smoking programs and medicines. These can increase your chances of quitting for good. · Breathe moist air from a hot shower or from a sink filled with hot water to help clear a stuffy nose. · Before you use cough and cold medicines, check the label. These medicines may not be safe for young children or for people with certain health problems. · If the skin around your nose and lips becomes sore, put some petroleum jelly on the area. · To ease coughing: ¨ Drink fluids to soothe a scratchy throat. ¨ Suck on cough drops or plain hard candy. ¨ Take an over-the-counter cough medicine that contains dextromethorphan to help you get some sleep. Read and follow all instructions on the label. ¨ Raise your head at night with an extra pillow. This may help you rest if coughing keeps you awake. · Take any prescribed medicine exactly as directed. Call your doctor if you think you are having a problem with your medicine. To avoid spreading the flu · Wash your hands regularly, and keep your hands away from your face. · Stay home from school, work, and other public places until you are feeling better and your fever has been gone for at least 24 hours. The fever needs to have gone away on its own without the help of medicine.  
· Ask people living with you to talk to their doctors about preventing the flu. They may get antiviral medicine to keep from getting the flu from you. · To prevent the flu in the future, get a flu vaccine every fall. Encourage people living with you to get the vaccine. · Cover your mouth when you cough or sneeze. When should you call for help? Call 911 anytime you think you may need emergency care. For example, call if: 
· You have severe trouble breathing. Call your doctor now or seek immediate medical care if: 
· You have new or worse trouble breathing. · You seem to be getting much sicker. · You feel very sleepy or confused. · You have a new or higher fever. · You get a new rash. Watch closely for changes in your health, and be sure to contact your doctor if: 
· You begin to get better and then get worse. · You are not getting better after 1 week. Where can you learn more? Go to http://enriqueNetlimiranda.info/. Enter T628 in the search box to learn more about \"Influenza (Flu): Care Instructions. \" Current as of: May 23, 2016 Content Version: 11.2 © 9627-9092 [employer]. Care instructions adapted under license by [employer] (which disclaims liability or warranty for this information). If you have questions about a medical condition or this instruction, always ask your healthcare professional. [place] [address] any warranty or liability for your use of this information. Asthma Attack: Care Instructions Your Care Instructions During an asthma attack, the airways swell and narrow. This makes it hard to breathe. Severe asthma attacks can be life-threatening, but you can help prevent them by keeping your asthma under control and treating symptoms before they get bad. Symptoms include being short of breath, having chest tightness, coughing, and wheezing. Noting and treating these symptoms can also help you avoid future trips to the emergency room. The doctor has checked you carefully, but problems can develop later. If you notice any problems or new symptoms, get medical treatment right away. Follow-up care is a key part of your treatment and safety. Be sure to make and go to all appointments, and call your doctor if you are having problems. It's also a good idea to know your test results and keep a list of the medicines you take. How can you care for yourself at home? · Follow your asthma action plan to prevent and treat attacks. If you don't have an asthma action plan, work with your doctor to create one. · Take your asthma medicines exactly as prescribed. Talk to your doctor right away if you have any questions about how to take them. ¨ Use your quick-relief medicine when you have symptoms of an attack. Quick-relief medicine is usually an albuterol inhaler. Some people need to use quick-relief medicine before they exercise. ¨ Take your controller medicine every day, not just when you have symptoms. Controller medicine is usually an inhaled corticosteroid. The goal is to prevent problems before they occur. Don't use your controller medicine to treat an attack that has already started. It doesn't work fast enough to help. ¨ If your doctor prescribed corticosteroid pills to use during an attack, take them exactly as prescribed. It may take hours for the pills to work, but they may make the episode shorter and help you breathe better. ¨ Keep your quick-relief medicine with you at all times. · Talk to your doctor before using other medicines. Some medicines, such as aspirin, can cause asthma attacks in some people. · If you have a peak flow meter, use it to check how well you are breathing. This can help you predict when an asthma attack is going to occur. Then you can take medicine to prevent the asthma attack or make it less severe. · Do not smoke or allow others to smoke around you. Avoid smoky places. Smoking makes asthma worse. If you need help quitting, talk to your doctor about stop-smoking programs and medicines. These can increase your chances of quitting for good. · Learn what triggers an asthma attack for you, and avoid the triggers when you can. Common triggers include colds, smoke, air pollution, dust, pollen, mold, pets, cockroaches, stress, and cold air. · Avoid colds and the flu. Get a pneumococcal vaccine shot. If you have had one before, ask your doctor if you need a second dose. Get a flu vaccine every fall. If you must be around people with colds or the flu, wash your hands often. When should you call for help? Call 911 anytime you think you may need emergency care. For example, call if: 
· You have severe trouble breathing. Call your doctor now or seek immediate medical care if: 
· Your symptoms do not get better after you have followed your asthma action plan. · You have new or worse trouble breathing. · Your coughing and wheezing get worse. · You cough up dark brown or bloody mucus (sputum). · You have a new or higher fever. Watch closely for changes in your health, and be sure to contact your doctor if: 
· You need to use quick-relief medicine on more than 2 days a week (unless it is just for exercise). · You cough more deeply or more often, especially if you notice more mucus or a change in the color of your mucus. · You are not getting better as expected. Where can you learn more? Go to http://enrique-miranda.info/. Enter H064 in the search box to learn more about \"Asthma Attack: Care Instructions. \" Current as of: May 23, 2016 Content Version: 11.2 © 7094-8400 [employer]. Care instructions adapted under license by [employer] (which disclaims liability or warranty for this information).  If you have questions about a medical condition or this instruction, always ask your healthcare professional. [employer] disclaims any warranty or liability for your use of this information. Discharge Orders None Our Security Team Announcement We are excited to announce that we are making your provider's discharge notes available to you in Our Security Team. You will see these notes when they are completed and signed by the physician that discharged you from your recent hospital stay. If you have any questions or concerns about any information you see in Our Security Team, please call the Health Information Department where you were seen or reach out to your Primary Care Provider for more information about your plan of care. Introducing [facility]! Dear Ashley Dao: 
Thank you for requesting a Our Security Team account. Our records indicate that you already have an active Our Security Team account. You can access your account anytime at [email or web address] Did you know that you can access your hospital and ER discharge instructions at any time in Our Security Team? You can also review all of your test results from your hospital stay or ER visit. Additional Information If you have questions, please visit the Frequently Asked Questions section of the Our Security Team website at [email or web address] Remember, Our Security Team is NOT to be used for urgent needs. For medical emergencies, dial 911. Now available from your iPhone and Android! General Information Please provide this summary of care documentation to your next provider. Patient Signature:  ____________________________________________________________ Date:  ____________________________________________________________  
  
Luc Breath Provider Signature:  ____________________________________________________________ Date:  ____________________________________________________________

## 2017-04-16 NOTE — H&P
HISTORY AND PHYSICAL      Kelly Locke    4/16/2017    Date of Admission:  4/16/2017    The patient's chart is reviewed and the patient is discussed with the staff. Subjective:     Patient is a 42 y.o.  female presents with fever and cough. She has a history of bronchiectasis with frequent respiratory infections in the past. She was just seen in our office and started on clarithromycin. She reports since yesterday having fevers up to 102 with a change in her cough and sputum production with more sputum that has gone from yellow to green. She feels weak and more short of breath. She came to the ER where she is not hypoxia, WBC and CXR normal. However she does have some wheeze and is quite hesitant about trying to be treated at home. Review of Systems  A comprehensive review of systems was negative except for that written in the HPI. Patient Active Problem List   Diagnosis Code    Moderate persistent asthma without complication N47.44    Snoring R06.83    Morbid obesity (HCC) E66.01    Anxiety and depression F41.9, F32.9    Diabetes mellitus type 2, controlled ([address] E11.9    Bronchiectasis (RML) without complication ([address] S57.1    Polycystic ovarian disease E28.2    Peripheral edema R60.9    Sinus tachycardia R00.0    Near syncope R55    Lung nodule < 6cm on CT R91.1    Bronchiectasis with (acute) exacerbation J47.1    Fever R50.9       Prior to Admission Medications   Prescriptions Last Dose Informant Patient Reported? Taking? albuterol (PROAIR HFA) 90 mcg/actuation inhaler   No No   Sig: Take 2 Puffs by inhalation every four (4) hours as needed. albuterol (PROVENTIL VENTOLIN) 2.5 mg /3 mL (0.083 %) nebulizer solution   Yes No   Sig: USE 1 VIAL NEB Q 4 H   aspirin 81 mg tablet   Yes No   Sig: Take 81 mg by mouth.   clonazePAM (KLONOPIN) 1 mg tablet   No No   Sig: Take 1 Tab by mouth nightly as needed. Max Daily Amount: 1 mg.    fluticasone-salmeterol (ADVAIR DISKUS) 250-50 mcg/dose diskus inhaler   No No   Sig: Take 1 Puff by inhalation two (2) times a day. guaiFENesin ER (MUCINEX) 600 mg ER tablet   Yes No   Sig: Take 600 mg by mouth two (2) times a day. hydroCHLOROthiazide (HYDRODIURIL) 12.5 mg tablet   No No   Sig: Take 1 Tab by mouth daily. multivitamin (ONE A DAY) tablet   Yes No   Sig: Take 1 Tab by mouth daily. ondansetron hcl (ZOFRAN, AS HYDROCHLORIDE,) 8 mg tablet   No No   Sig: Take 1 Tab by mouth every eight (8) hours as needed for Nausea. predniSONE (DELTASONE) 20 mg tablet   No No   Sig: Take 2 tabs x 3 days, take 1.5 tab x 3 days, take 1 tab x 3 days, take 0.5 tab x 3 days   sertraline (ZOLOFT) 50 mg tablet   No No   Sig: Take 1 Tab by mouth daily. Facility-Administered Medications: None       Past Medical History:   Diagnosis Date    Recurrent pneumonia [id number]    The patient reports having been hospitalized multiple times with recurrent pneumonias during a 2 year period.  Thromboembolism ([facility] [id number]    During one of the patient's hospitalizations, she developed deep vein thrombosis and pulmonary emboli. She took Coumadin for approximately 2 years. 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   She does not use alcohol. She had considerable secondhand exposure to cigarette smoke as a child from both of her parents who smoked. There is no significant environmental or industrial exposure. She has no known exposure to TB. She has always lived in this area. She has no indoor pets or birds. She is  and living with her . Family History   Problem Relation Age of Onset    COPD Mother    [facility] Cancer Mother      Lung    Breast Cancer Mother 54    Diabetes Father     Heart Disease Father     Heart Disease Sister     Hypertension Brother      Allergies   Allergen Reactions    Keflex [Cephalexin] Anaphylaxis    Vancomycin Anaphylaxis    Penicillins Anaphylaxis    Sulfa (Sulfonamide Antibiotics) Anaphylaxis    Sulfamethoxazole Other (comments)     Wheezing     Prednisone Swelling     Face turns red, nervousness       Current Facility-Administered Medications   Medication Dose Route Frequency    sodium chloride (NS) flush 5-10 mL  5-10 mL IntraVENous Q8H    sodium chloride (NS) flush 5-10 mL  5-10 mL IntraVENous PRN     Current Outpatient Prescriptions   Medication Sig    albuterol (PROVENTIL VENTOLIN) 2.5 mg /3 mL (0.083 %) nebulizer solution USE 1 VIAL NEB Q 4 H    sertraline (ZOLOFT) 50 mg tablet Take 1 Tab by mouth daily.  guaiFENesin ER (MUCINEX) 600 mg ER tablet Take 600 mg by mouth two (2) times a day.  predniSONE (DELTASONE) 20 mg tablet Take 2 tabs x 3 days, take 1.5 tab x 3 days, take 1 tab x 3 days, take 0.5 tab x 3 days    clonazePAM (KLONOPIN) 1 mg tablet Take 1 Tab by mouth nightly as needed. Max Daily Amount: 1 mg.  ondansetron hcl (ZOFRAN, AS HYDROCHLORIDE,) 8 mg tablet Take 1 Tab by mouth every eight (8) hours as needed for Nausea.  fluticasone-salmeterol (ADVAIR DISKUS) 250-50 mcg/dose diskus inhaler Take 1 Puff by inhalation two (2) times a day.  albuterol (PROAIR HFA) 90 mcg/actuation inhaler Take 2 Puffs by inhalation every four (4) hours as needed.     hydroCHLOROthiazide (HYDRODIURIL) 12.5 mg tablet Take 1 Tab by mouth daily.  multivitamin (ONE A DAY) tablet Take 1 Tab by mouth daily.  aspirin 81 mg tablet Take 81 mg by mouth. Objective:     Vitals:    04/16/17 1647   BP: 129/62   Pulse: (!) 122   Resp: 22   Temp: 99.8 °F (37.7 °C)   SpO2: 97%   Weight: 240 lb (108.9 kg)   Height: 5' 3\" (1.6 m)       PHYSICAL EXAM     Constitutional:  the patient is well developed and in no acute distress  EENMT:  Sclera clear, pupils equal, oral mucosa moist  Respiratory: Mild B exp wheeze  Cardiovascular:  RRR without M,G,R  Gastrointestinal: soft and non-tender; with positive bowel sounds. Musculoskeletal: warm without cyanosis. There is no lower leg edema. Skin:  no jaundice or rashes, no wounds   Neurologic: no gross neuro deficits     Psychiatric:  alert and oriented x ppt    CXR:  4/16/17  IMPRESSION: No acute findings in the chest      Recent Labs      04/16/17   1654   WBC  5.2   HGB  14.8   HCT  42.8   PLT  305     Recent Labs      04/16/17   1654   NA  142   K  3.5   CL  107   GLU  116*   CO2  26   BUN  10   CREA  0.88   CA  9.0   ALB  3.9   TBILI  0.4   ALT  65   SGOT  46*     No results for input(s): PH, PCO2, PO2, HCO3 in the last 72 hours. No results for input(s): LCAD, LAC in the last 72 hours.     Assessment:  (Medical Decision Making)     Hospital Problems  Date Reviewed: 4/16/2017          Codes Class Noted POA    * (Principal)Bronchiectasis with (acute) exacerbation ICD-10-CM: J47.1  ICD-9-CM: 494.1  4/16/2017 Unknown        Fever ICD-10-CM: R50.9  ICD-9-CM: 780.60  4/16/2017 Unknown        Sinus tachycardia ICD-10-CM: R00.0  ICD-9-CM: 427.89  3/28/2017 Yes        Diabetes mellitus type 2, controlled ([facility] (Chronic) ICD-10-CM: E11.9  ICD-9-CM: 250.00  8/24/2016 Yes        Morbid obesity (HCC) (Chronic) ICD-10-CM: E66.01  ICD-9-CM: 278.01  4/3/2015 Yes        Anxiety and depression (Chronic) ICD-10-CM: F41.9, F32.9  ICD-9-CM: 300.00, 311 4/3/2015 Yes            Patient with likely bronchitectasis exacerbation. No obvious pna on CXR. Has not improved with oral abx started in our office. Plan:  (Medical Decision Making)     --Will admit for further medical management on obs basis. --Respiratory nebulizer treatments  --culture sputum: send bacterial and afb cultures. --steroid therapy: oral prednisone. Has had issues with IV steroids in the past.   --antibiotic therapy: levaquin. Has multiple allergies to other abx.   --flutter valve. --check IgG's to rule out immunodeficiency. --poc and ssi. More than 50% of the time documented was spent in face-to-face contact with the patient and in the care of the patient on the floor/unit where the patient is located.     Prasanna Silverman MD

## 2017-04-16 NOTE — PROGRESS NOTES
Patient is requesting pain medication related to rig cage,  Headache, and cough. Page on-call physician for Umm Frey. Renetta Howard MD notified see [facility] for new orders.

## 2017-04-17 PROBLEM — R55 NEAR SYNCOPE: Status: RESOLVED | Noted: 2017-03-28 | Resolved: 2017-04-17

## 2017-04-17 LAB
ANION GAP BLD CALC-SCNC: 8 MMOL/L (ref 7–16)
ATRIAL RATE: 100 BPM
BUN SERPL-MCNC: 9 MG/DL (ref 6–23)
CALCIUM SERPL-MCNC: 8.2 MG/DL (ref 8.3–10.4)
CALCULATED P AXIS, ECG09: 49 DEGREES
CALCULATED R AXIS, ECG10: 76 DEGREES
CALCULATED T AXIS, ECG11: 58 DEGREES
CHLORIDE SERPL-SCNC: 108 MMOL/L (ref 98–107)
CO2 SERPL-SCNC: 27 MMOL/L (ref 21–32)
CREAT SERPL-MCNC: 0.8 MG/DL (ref 0.6–1)
DIAGNOSIS, 93000: NORMAL
ERYTHROCYTE [DISTWIDTH] IN BLOOD BY AUTOMATED COUNT: 14.8 % (ref 11.9–14.6)
GLUCOSE BLD STRIP.AUTO-MCNC: 111 MG/DL (ref 65–100)
GLUCOSE BLD STRIP.AUTO-MCNC: 112 MG/DL (ref 65–100)
GLUCOSE BLD STRIP.AUTO-MCNC: 114 MG/DL (ref 65–100)
GLUCOSE BLD STRIP.AUTO-MCNC: 87 MG/DL (ref 65–100)
GLUCOSE SERPL-MCNC: 92 MG/DL (ref 65–100)
HCT VFR BLD AUTO: 39.5 % (ref 35.8–46.3)
HGB BLD-MCNC: 13 G/DL (ref 11.7–15.4)
MCH RBC QN AUTO: 32 PG (ref 26.1–32.9)
MCHC RBC AUTO-ENTMCNC: 32.9 G/DL (ref 31.4–35)
MCV RBC AUTO: 97.3 FL (ref 79.6–97.8)
P-R INTERVAL, ECG05: 140 MS
PLATELET # BLD AUTO: 263 K/UL (ref 150–450)
PMV BLD AUTO: 10.6 FL (ref 10.8–14.1)
POTASSIUM SERPL-SCNC: 3.8 MMOL/L (ref 3.5–5.1)
Q-T INTERVAL, ECG07: 350 MS
QRS DURATION, ECG06: 72 MS
QTC CALCULATION (BEZET), ECG08: 451 MS
RBC # BLD AUTO: 4.06 M/UL (ref 4.05–5.25)
SODIUM SERPL-SCNC: 143 MMOL/L (ref 136–145)
VENTRICULAR RATE, ECG03: 100 BPM
WBC # BLD AUTO: 4.4 K/UL (ref 4.3–11.1)

## 2017-04-17 PROCEDURE — 99218 HC RM OBSERVATION: CPT

## 2017-04-17 PROCEDURE — 74011250636 HC RX REV CODE- 250/636: Performed by: INTERNAL MEDICINE

## 2017-04-17 PROCEDURE — 80048 BASIC METABOLIC PNL TOTAL CA: CPT | Performed by: INTERNAL MEDICINE

## 2017-04-17 PROCEDURE — 0BC68ZZ EXTIRPATION OF MATTER FROM RIGHT LOWER LOBE BRONCHUS, VIA NATURAL OR ARTIFICIAL OPENING ENDOSCOPIC: ICD-10-PCS | Performed by: INTERNAL MEDICINE

## 2017-04-17 PROCEDURE — 77030012699 HC VLV SUC CNTRL OCOA -A: Performed by: INTERNAL MEDICINE

## 2017-04-17 PROCEDURE — 74011000250 HC RX REV CODE- 250: Performed by: INTERNAL MEDICINE

## 2017-04-17 PROCEDURE — 99232 SBSQ HOSP IP/OBS MODERATE 35: CPT | Performed by: INTERNAL MEDICINE

## 2017-04-17 PROCEDURE — 74011636637 HC RX REV CODE- 636/637: Performed by: INTERNAL MEDICINE

## 2017-04-17 PROCEDURE — 77030012341 HC CHMB SPCR OPTC MDI VYRM -A

## 2017-04-17 PROCEDURE — 36415 COLL VENOUS BLD VENIPUNCTURE: CPT | Performed by: INTERNAL MEDICINE

## 2017-04-17 PROCEDURE — 87633 RESP VIRUS 12-25 TARGETS: CPT | Performed by: INTERNAL MEDICINE

## 2017-04-17 PROCEDURE — 74011250637 HC RX REV CODE- 250/637: Performed by: INTERNAL MEDICINE

## 2017-04-17 PROCEDURE — 87486 CHLMYD PNEUM DNA AMP PROBE: CPT | Performed by: INTERNAL MEDICINE

## 2017-04-17 PROCEDURE — 87102 FUNGUS ISOLATION CULTURE: CPT | Performed by: INTERNAL MEDICINE

## 2017-04-17 PROCEDURE — 31645 BRNCHSC W/THER ASPIR 1ST: CPT | Performed by: INTERNAL MEDICINE

## 2017-04-17 PROCEDURE — 94640 AIRWAY INHALATION TREATMENT: CPT

## 2017-04-17 PROCEDURE — 87070 CULTURE OTHR SPECIMN AEROBIC: CPT | Performed by: INTERNAL MEDICINE

## 2017-04-17 PROCEDURE — 76040000019: Performed by: INTERNAL MEDICINE

## 2017-04-17 PROCEDURE — 74011250637 HC RX REV CODE- 250/637: Performed by: NURSE PRACTITIONER

## 2017-04-17 PROCEDURE — 82962 GLUCOSE BLOOD TEST: CPT

## 2017-04-17 PROCEDURE — 85027 COMPLETE CBC AUTOMATED: CPT | Performed by: INTERNAL MEDICINE

## 2017-04-17 PROCEDURE — 87116 MYCOBACTERIA CULTURE: CPT | Performed by: INTERNAL MEDICINE

## 2017-04-17 PROCEDURE — 77010033678 HC OXYGEN DAILY

## 2017-04-17 PROCEDURE — 94760 N-INVAS EAR/PLS OXIMETRY 1: CPT

## 2017-04-17 PROCEDURE — 77030020120 HC VLV RESP PEP HI -B

## 2017-04-17 PROCEDURE — 74011250636 HC RX REV CODE- 250/636

## 2017-04-17 RX ORDER — MIDAZOLAM HYDROCHLORIDE 1 MG/ML
.25-5 INJECTION, SOLUTION INTRAMUSCULAR; INTRAVENOUS
Status: DISCONTINUED | OUTPATIENT
Start: 2017-04-17 | End: 2017-04-17 | Stop reason: HOSPADM

## 2017-04-17 RX ORDER — SODIUM CHLORIDE 0.9 % (FLUSH) 0.9 %
5-10 SYRINGE (ML) INJECTION AS NEEDED
Status: DISCONTINUED | OUTPATIENT
Start: 2017-04-17 | End: 2017-04-20 | Stop reason: HOSPADM

## 2017-04-17 RX ORDER — SODIUM CHLORIDE 9 MG/ML
25 INJECTION, SOLUTION INTRAVENOUS CONTINUOUS
Status: DISCONTINUED | OUTPATIENT
Start: 2017-04-17 | End: 2017-04-18

## 2017-04-17 RX ORDER — ACETYLCYSTEINE 100 MG/ML
2 SOLUTION ORAL; RESPIRATORY (INHALATION) 2 TIMES DAILY
Status: DISCONTINUED | OUTPATIENT
Start: 2017-04-17 | End: 2017-04-17

## 2017-04-17 RX ORDER — DIPHENHYDRAMINE HYDROCHLORIDE 50 MG/ML
25-50 INJECTION, SOLUTION INTRAMUSCULAR; INTRAVENOUS
Status: DISCONTINUED | OUTPATIENT
Start: 2017-04-17 | End: 2017-04-17 | Stop reason: HOSPADM

## 2017-04-17 RX ORDER — LIDOCAINE HYDROCHLORIDE 40 MG/ML
SOLUTION TOPICAL ONCE
Status: COMPLETED | OUTPATIENT
Start: 2017-04-17 | End: 2017-04-17

## 2017-04-17 RX ORDER — ACETYLCYSTEINE 100 MG/ML
2 SOLUTION ORAL; RESPIRATORY (INHALATION) 2 TIMES DAILY
Status: DISCONTINUED | OUTPATIENT
Start: 2017-04-17 | End: 2017-04-20 | Stop reason: HOSPADM

## 2017-04-17 RX ORDER — LIDOCAINE HYDROCHLORIDE 20 MG/ML
JELLY TOPICAL AS NEEDED
Status: DISCONTINUED | OUTPATIENT
Start: 2017-04-17 | End: 2017-04-20 | Stop reason: HOSPADM

## 2017-04-17 RX ORDER — CLONAZEPAM 1 MG/1
1 TABLET ORAL
Status: DISCONTINUED | OUTPATIENT
Start: 2017-04-17 | End: 2017-04-20 | Stop reason: HOSPADM

## 2017-04-17 RX ORDER — FENTANYL CITRATE 50 UG/ML
50 INJECTION, SOLUTION INTRAMUSCULAR; INTRAVENOUS
Status: DISCONTINUED | OUTPATIENT
Start: 2017-04-17 | End: 2017-04-17 | Stop reason: HOSPADM

## 2017-04-17 RX ORDER — CLINDAMYCIN HYDROCHLORIDE 300 MG/1
500 CAPSULE ORAL 2 TIMES DAILY
COMMUNITY
Start: 2017-04-12 | End: 2017-04-20

## 2017-04-17 RX ORDER — SODIUM CHLORIDE 0.9 % (FLUSH) 0.9 %
5-10 SYRINGE (ML) INJECTION EVERY 8 HOURS
Status: DISCONTINUED | OUTPATIENT
Start: 2017-04-17 | End: 2017-04-20 | Stop reason: HOSPADM

## 2017-04-17 RX ORDER — GUAIFENESIN 600 MG/1
1200 TABLET, EXTENDED RELEASE ORAL 2 TIMES DAILY
Status: DISCONTINUED | OUTPATIENT
Start: 2017-04-17 | End: 2017-04-20 | Stop reason: HOSPADM

## 2017-04-17 RX ADMIN — HYDROCHLOROTHIAZIDE 12.5 MG: 25 TABLET ORAL at 07:50

## 2017-04-17 RX ADMIN — ALBUTEROL SULFATE 2.5 MG: 2.5 SOLUTION RESPIRATORY (INHALATION) at 19:51

## 2017-04-17 RX ADMIN — FENTANYL CITRATE 50 MCG: 50 INJECTION, SOLUTION INTRAMUSCULAR; INTRAVENOUS at 14:09

## 2017-04-17 RX ADMIN — GUAIFENESIN 600 MG: 600 TABLET, EXTENDED RELEASE ORAL at 07:53

## 2017-04-17 RX ADMIN — ASPIRIN 81 MG: 81 TABLET, COATED ORAL at 07:53

## 2017-04-17 RX ADMIN — ALBUTEROL SULFATE 2.5 MG: 2.5 SOLUTION RESPIRATORY (INHALATION) at 05:12

## 2017-04-17 RX ADMIN — ENOXAPARIN SODIUM 40 MG: 40 INJECTION SUBCUTANEOUS at 20:26

## 2017-04-17 RX ADMIN — FENTANYL CITRATE 50 MCG: 50 INJECTION, SOLUTION INTRAMUSCULAR; INTRAVENOUS at 14:11

## 2017-04-17 RX ADMIN — LIDOCAINE HYDROCHLORIDE 7 ML: 40 SOLUTION TOPICAL at 14:10

## 2017-04-17 RX ADMIN — ALBUTEROL SULFATE 2.5 MG: 2.5 SOLUTION RESPIRATORY (INHALATION) at 11:46

## 2017-04-17 RX ADMIN — ENOXAPARIN SODIUM 40 MG: 40 INJECTION SUBCUTANEOUS at 09:14

## 2017-04-17 RX ADMIN — LEVOFLOXACIN 750 MG: 5 INJECTION, SOLUTION INTRAVENOUS at 20:19

## 2017-04-17 RX ADMIN — FENTANYL CITRATE 50 MCG: 50 INJECTION, SOLUTION INTRAMUSCULAR; INTRAVENOUS at 14:06

## 2017-04-17 RX ADMIN — Medication 5 ML: at 13:25

## 2017-04-17 RX ADMIN — ACETAMINOPHEN 650 MG: 325 TABLET ORAL at 20:22

## 2017-04-17 RX ADMIN — HYDROCODONE BITARTRATE AND HOMATROPINE METHYLBROMIDE 5 ML: 5; 1.5 SOLUTION ORAL at 06:42

## 2017-04-17 RX ADMIN — MIDAZOLAM HYDROCHLORIDE 1 MG: 1 INJECTION, SOLUTION INTRAMUSCULAR; INTRAVENOUS at 14:14

## 2017-04-17 RX ADMIN — BUDESONIDE 500 MCG: 0.5 INHALANT RESPIRATORY (INHALATION) at 19:51

## 2017-04-17 RX ADMIN — BUDESONIDE 500 MCG: 0.5 INHALANT RESPIRATORY (INHALATION) at 11:46

## 2017-04-17 RX ADMIN — LIDOCAINE HYDROCHLORIDE 1 ML: 20 JELLY TOPICAL at 14:11

## 2017-04-17 RX ADMIN — DIPHENHYDRAMINE HYDROCHLORIDE 50 MG: 50 INJECTION, SOLUTION INTRAMUSCULAR; INTRAVENOUS at 13:55

## 2017-04-17 RX ADMIN — ACETAMINOPHEN 650 MG: 325 TABLET ORAL at 07:50

## 2017-04-17 RX ADMIN — PREDNISONE 40 MG: 20 TABLET ORAL at 07:54

## 2017-04-17 RX ADMIN — Medication 10 ML: at 05:38

## 2017-04-17 RX ADMIN — MIDAZOLAM HYDROCHLORIDE 2 MG: 1 INJECTION, SOLUTION INTRAMUSCULAR; INTRAVENOUS at 14:06

## 2017-04-17 RX ADMIN — SODIUM CHLORIDE 25 ML/HR: 900 INJECTION, SOLUTION INTRAVENOUS at 14:00

## 2017-04-17 RX ADMIN — Medication 5 ML: at 20:24

## 2017-04-17 RX ADMIN — MIDAZOLAM HYDROCHLORIDE 1 MG: 1 INJECTION, SOLUTION INTRAMUSCULAR; INTRAVENOUS at 14:10

## 2017-04-17 RX ADMIN — CLONAZEPAM 1 MG: 1 TABLET ORAL at 23:13

## 2017-04-17 RX ADMIN — GUAIFENESIN 1200 MG: 600 TABLET, EXTENDED RELEASE ORAL at 20:23

## 2017-04-17 NOTE — PROGRESS NOTES
TRANSFER - IN REPORT:    Verbal report received from GALI Gale(name) on Duran Ames  being received from [address] [place] [address]) for routine progression of care      Report consisted of patients Situation, Background, Assessment and   Recommendations(SBAR). Information from the following report(s) SBAR, Kardex, Procedure Summary, MAR and Recent Results was reviewed with the receiving nurse. Opportunity for questions and clarification was provided. Assessment completed upon patients arrival to unit and care assumed.

## 2017-04-17 NOTE — PROCEDURES
PROCEDURE    Bronchoscopy with airway inspection/cleanout/    INDICATION   Bronchiectasis exacerbation    EQUIPMENT:  Olympus T 180 Bronchhoscope. ANESTHESIA    Concious sedation with: Fentanyl 150 mcg IV; Versed 4 mg IV; Lidocaine 160  mg to tracheo-bronchial tree and vocal cords;       AIRWAY INSPECTION    After obtaining informed consent, using a bite block/ET tube adapter, an Olympus t 180 video/fiberoptic bronchoscope was  introduced into the trachea through the vocal chords/, without complication. RIGHT    LOCATION NORM/ABNORM DESCRIPTION   VOCAL CORDS NL    TRACHEA NL    SHEREE  Proximal trachea inflammed with increased friability   RMSB     RUL     BI     RML  Diffuse acute inflammation with edema and erythema   SUP SEGM RLL  Large amount of  thck brown mucus with mucus plugging removed with saline   MED BASAL     ANTERIOR BASAL     LATERAL BASAL     POSTERIOR BASAL                                               LEFT    LOCATION NORMAL/ABNORMAL TYPE   LMSB     PRATIK     LINGULA     SUPERIOR DIVISION     SUPERIOR SEG LLL     VILLA-MEDIAL LLL     LATERAL LLL     POSTERIOR LLL       The following samples were obtained:      Bronchial Wash:  es were sent for:  cultures  The procedure was completed  without complication and the patient tolerated the procedure well. Estimated blood loss-  0 to minimum  Recommendations:   Follow up results  Cely Bryan MD

## 2017-04-17 NOTE — ROUTINE PROCESS
Dual skin assessment with Jess Dixon  - left forearm bruising  - skin is intact   - tattoos right forearm   Medication side effects fact sheet provided and reviewed with patient/family.

## 2017-04-17 NOTE — PROGRESS NOTES
Wihtney Santo [id number], RN in [facility] informed this nurse that patient is to remain NPO until 1600.

## 2017-04-17 NOTE — PROGRESS NOTES
MEWS of 3. Patient temp up to 102 this morning. She states before she came in she had been taking tylenol regularly and it had been given last night. She is coughing up green thick colored sputum, sample sent down last night. Tylenol 650 mg given now.

## 2017-04-17 NOTE — H&P
Date of Surgery Update:  Suzanne Bates was seen and examined. History and physical has been reviewed. The patient has been examined.  There have been no significant clinical changes since the completion of the originally dated History and Physical.    Signed By: Aaliyah Abernathy MD     April 17, 2017 2:19 PM

## 2017-04-17 NOTE — PROGRESS NOTES
Swapnil Lomeli  Admission Date: 4/16/2017             Daily Progress Note: 4/17/2017    The patient's chart is reviewed and the patient is discussed with the staff. Admitted yesterday with fever and congestion. Secretions thick and hard to clear - history of bronchiectasis. Uses vest at home. Subjective: Thinks bronchoscopy would be helpful. Questions about pulmonary nodule and follow up. She still hears wheezing and she feels weak with the fever.      Current Facility-Administered Medications   Medication Dose Route Frequency    guaiFENesin ER (MUCINEX) tablet 1,200 mg  1,200 mg Oral BID    acetylcysteine (MUCOMYST) 100 mg/mL (10 %) nebulizer solution 200 mg  2 mL Nebulization BID    sodium chloride (NS) flush 5-10 mL  5-10 mL IntraVENous Q8H    sodium chloride (NS) flush 5-10 mL  5-10 mL IntraVENous PRN    albuterol (PROVENTIL VENTOLIN) nebulizer solution 2.5 mg  2.5 mg Nebulization QID RT    aspirin delayed-release tablet 81 mg  81 mg Oral DAILY    clonazePAM (KlonoPIN) tablet 1 mg  1 mg Oral QHS PRN    hydroCHLOROthiazide (HYDRODIURIL) tablet 12.5 mg  12.5 mg Oral DAILY    sertraline (ZOLOFT) tablet 50 mg  50 mg Oral DAILY    sodium chloride (NS) flush 5-10 mL  5-10 mL IntraVENous Q8H    sodium chloride (NS) flush 5-10 mL  5-10 mL IntraVENous PRN    budesonide (PULMICORT) 500 mcg/2 ml nebulizer suspension  500 mcg Nebulization BID RT    predniSONE (DELTASONE) tablet 40 mg  40 mg Oral DAILY WITH BREAKFAST    levoFLOXacin (LEVAQUIN) 750 mg in D5W IVPB  750 mg IntraVENous Q24H    enoxaparin (LOVENOX) injection 40 mg  40 mg SubCUTAneous Q12H    insulin lispro (HUMALOG) injection   SubCUTAneous AC&HS    HYDROcodone-homatropine (HYCODAN) 5-1.5 mg/5 mL (5 mL) syrup 5 mL  5 mL Oral Q4H PRN    acetaminophen (TYLENOL) tablet 650 mg  650 mg Oral Q4H PRN         Objective:     Vitals:    04/17/17 0515 04/17/17 0640 04/17/17 0733 04/17/17 0905   BP:   127/72    Pulse:   99 Resp:   18    Temp:  99.8 °F (37.7 °C) (!) 102 °F (38.9 °C) 99.6 °F (37.6 °C)   SpO2: 96%  100%    Weight:       Height:         Intake and Output:           Physical Exam:   Constitutional:  the patient is well developed and in no acute distress  HEENT:  Sclera clear, pupils equal, oral mucosa moist  Lungs: wheezing - on the left. Clear on the right. On room air  Cardiovascular:  RRR without M,G,R  Abd/GI: soft and non-tender; with positive bowel sounds. Ext: warm without cyanosis. There is no lower leg edema. Musculoskeletal: moves all four extremities with equal strength  Skin:  no jaundice or rashes, no wounds   Neuro: no gross neuro deficits   Musculoskeletal: can ambulate. No deformity  Psychiatric: Calm. ROS: feels weak, history anxiety  Lines: peripheral IV    CHEST XRAY:  None today    LAB  Recent Labs      04/17/17   0500  04/16/17   1654   WBC  4.4  5.2   HGB  13.0  14.8   HCT  39.5  42.8   PLT  263  305     Recent Labs      04/17/17   0500  04/16/17   1654   NA  143  142   K  3.8  3.5   CL  108*  107   CO2  27  26   GLU  92  116*   BUN  9  10   CREA  0.80  0.88   ALB   --   3.9   SGOT   --   46*         Assessment:  (Medical Decision Making)     Hospital Problems  Date Reviewed: 4/16/2017          Codes Class Noted POA    * (Principal)Bronchiectasis with (acute) exacerbation ICD-10-CM: J47.1  ICD-9-CM: 494.1  4/16/2017 Yes    Some wheezing. Secretions thick and hard to clear. Fever ICD-10-CM: R50.9  ICD-9-CM: 780.60  4/16/2017 Yes    Suspect pulmonary in etiology. Secretions green in color per patient.  WBC normal    Lung nodule < 6cm on CT ICD-10-CM: R91.1  ICD-9-CM: 793.11  4/5/2017 Yes    Initial CT in August 2016 - flora 5 mm, now measures 8 mm    Sinus tachycardia ICD-10-CM: R00.0  ICD-9-CM: 427.89  3/28/2017 Yes    Some better now but suspect secondary to fever    Diabetes mellitus type 2, controlled ([address] (Chronic) ICD-10-CM: E11.9  ICD-9-CM: 250.00  8/24/2016 Yes    controlled    Morbid obesity ([facility] (Chronic) ICD-10-CM: E66.01  ICD-9-CM: 278.01  4/3/2015 Yes    Contributes to the complexity of care    Anxiety and depression (Chronic) ICD-10-CM: F41.9, F32.9  ICD-9-CM: 300.00, 311  4/3/2015 Yes    On home meds          Plan:  (Medical Decision Making)   1. Schedule bronch for today  2. Add mucomyst and increase mucinex dose  3. Day 2 levaquin. Sputum culture negative so far. Monitor temp, bronchial washing results  4. Oral steroids  5. IgG level normal at 797  6. Follow up CT ordered for August to reassess pulmonary nodule    Geoffrey Parrish NP    More than 50% of time documented was spent face-to-face contact with the patient and in the care of the patient on the floor/unit where the patient is located. Lungs:  rhonchi  Heart:  RRR with no Murmur/Rubs/Gallops    Additional Comments:  Bronch later today  I have spoken with and examined the patient. I agree with the above assessment and plan as documented.     Zee Snow MD

## 2017-04-17 NOTE — ROUTINE PROCESS
TRANSFER - OUT REPORT:    Verbal report given to [facility]NA [facility]HOURY on Romain Meza  for routine progression of care       Report consisted of patients Situation, Background, Assessment and   Recommendations(SBAR). Information from the following report(s) Procedure Summary was reviewed with the receiving nurse. Lines:   Peripheral IV 03/28/17 Right Antecubital (Active)       Peripheral IV 04/16/17 Right Forearm (Active)   Site Assessment Clean, dry, & intact 4/17/2017  2:20 AM   Phlebitis Assessment 0 4/17/2017  2:20 AM   Infiltration Assessment 0 4/17/2017  2:20 AM   Dressing Status Clean, dry, & intact 4/17/2017  2:20 AM   Dressing Type Tape;Transparent 4/17/2017  2:20 AM   Hub Color/Line Status Capped 4/17/2017  2:20 AM   Action Taken Open ports on tubing capped 4/17/2017  2:20 AM   Alcohol Cap Used Yes 4/17/2017  2:20 AM        Opportunity for questions and clarification was provided.

## 2017-04-18 PROBLEM — R29.818 SUSPECTED SLEEP APNEA: Status: ACTIVE | Noted: 2017-04-18

## 2017-04-18 LAB
FLUAV RNA SPEC QL NAA+PROBE: POSITIVE
FLUBV RNA SPEC QL NAA+PROBE: NEGATIVE
GLUCOSE BLD STRIP.AUTO-MCNC: 107 MG/DL (ref 65–100)
GLUCOSE BLD STRIP.AUTO-MCNC: 107 MG/DL (ref 65–100)
GLUCOSE BLD STRIP.AUTO-MCNC: 159 MG/DL (ref 65–100)
GLUCOSE BLD STRIP.AUTO-MCNC: 98 MG/DL (ref 65–100)
HADV DNA SPEC QL NAA+PROBE: NEGATIVE
HMPV RNA SPEC QL NAA+PROBE: NEGATIVE
HPIV1 RNA SPEC QL NAA+PROBE: NEGATIVE
HPIV2 RNA SPEC QL NAA+PROBE: NEGATIVE
HPIV3 RNA SPEC QL NAA+PROBE: NEGATIVE
IGG SERPL-MCNC: 734 MG/DL (ref 700–1600)
IGG1 SER-MCNC: 479 MG/DL (ref 422–1292)
IGG2 SER-MCNC: 169 MG/DL (ref 117–747)
IGG3 SER-MCNC: 78 MG/DL (ref 41–129)
IGG4 SER-MCNC: 8 MG/DL (ref 1–291)
RHINOVIRUS RNA SPEC QL NAA+PROBE: NEGATIVE
RSV A RNA SPEC QL NAA+PROBE: NEGATIVE
RSV B RNA SPEC QL NAA+PROBE: NEGATIVE
SPECIMEN SOURCE: ABNORMAL

## 2017-04-18 PROCEDURE — 65270000029 HC RM PRIVATE

## 2017-04-18 PROCEDURE — 94760 N-INVAS EAR/PLS OXIMETRY 1: CPT

## 2017-04-18 PROCEDURE — 74011250637 HC RX REV CODE- 250/637: Performed by: INTERNAL MEDICINE

## 2017-04-18 PROCEDURE — 74011250637 HC RX REV CODE- 250/637: Performed by: NURSE PRACTITIONER

## 2017-04-18 PROCEDURE — 82962 GLUCOSE BLOOD TEST: CPT

## 2017-04-18 PROCEDURE — 99232 SBSQ HOSP IP/OBS MODERATE 35: CPT | Performed by: INTERNAL MEDICINE

## 2017-04-18 PROCEDURE — 74011000250 HC RX REV CODE- 250: Performed by: INTERNAL MEDICINE

## 2017-04-18 PROCEDURE — 74011250636 HC RX REV CODE- 250/636: Performed by: INTERNAL MEDICINE

## 2017-04-18 PROCEDURE — 94640 AIRWAY INHALATION TREATMENT: CPT

## 2017-04-18 PROCEDURE — 74011636637 HC RX REV CODE- 636/637: Performed by: INTERNAL MEDICINE

## 2017-04-18 PROCEDURE — 99218 HC RM OBSERVATION: CPT

## 2017-04-18 RX ADMIN — ACETAMINOPHEN 650 MG: 325 TABLET ORAL at 00:20

## 2017-04-18 RX ADMIN — SERTRALINE HYDROCHLORIDE 50 MG: 50 TABLET ORAL at 08:22

## 2017-04-18 RX ADMIN — ACETAMINOPHEN 650 MG: 325 TABLET ORAL at 08:23

## 2017-04-18 RX ADMIN — CLONAZEPAM 1 MG: 1 TABLET ORAL at 21:55

## 2017-04-18 RX ADMIN — ENOXAPARIN SODIUM 40 MG: 40 INJECTION SUBCUTANEOUS at 21:55

## 2017-04-18 RX ADMIN — GUAIFENESIN 1200 MG: 600 TABLET, EXTENDED RELEASE ORAL at 21:55

## 2017-04-18 RX ADMIN — ALBUTEROL SULFATE 2.5 MG: 2.5 SOLUTION RESPIRATORY (INHALATION) at 08:13

## 2017-04-18 RX ADMIN — BUDESONIDE 500 MCG: 0.5 INHALANT RESPIRATORY (INHALATION) at 19:41

## 2017-04-18 RX ADMIN — HYDROCHLOROTHIAZIDE 12.5 MG: 25 TABLET ORAL at 08:22

## 2017-04-18 RX ADMIN — ALBUTEROL SULFATE 2.5 MG: 2.5 SOLUTION RESPIRATORY (INHALATION) at 15:54

## 2017-04-18 RX ADMIN — Medication 5 ML: at 13:00

## 2017-04-18 RX ADMIN — ASPIRIN 81 MG: 81 TABLET, COATED ORAL at 08:22

## 2017-04-18 RX ADMIN — ENOXAPARIN SODIUM 40 MG: 40 INJECTION SUBCUTANEOUS at 08:23

## 2017-04-18 RX ADMIN — Medication 10 ML: at 21:55

## 2017-04-18 RX ADMIN — ACETYLCYSTEINE: 100 SOLUTION ORAL; RESPIRATORY (INHALATION) at 19:41

## 2017-04-18 RX ADMIN — PREDNISONE 40 MG: 20 TABLET ORAL at 08:22

## 2017-04-18 RX ADMIN — INSULIN LISPRO 2 UNITS: 100 INJECTION, SOLUTION INTRAVENOUS; SUBCUTANEOUS at 16:56

## 2017-04-18 RX ADMIN — Medication 10 ML: at 04:43

## 2017-04-18 RX ADMIN — BUDESONIDE 500 MCG: 0.5 INHALANT RESPIRATORY (INHALATION) at 08:13

## 2017-04-18 RX ADMIN — LEVOFLOXACIN 750 MG: 5 INJECTION, SOLUTION INTRAVENOUS at 21:55

## 2017-04-18 RX ADMIN — ALBUTEROL SULFATE 2.5 MG: 2.5 SOLUTION RESPIRATORY (INHALATION) at 19:41

## 2017-04-18 RX ADMIN — GUAIFENESIN 1200 MG: 600 TABLET, EXTENDED RELEASE ORAL at 08:22

## 2017-04-18 RX ADMIN — ACETYLCYSTEINE: 100 SOLUTION ORAL; RESPIRATORY (INHALATION) at 08:13

## 2017-04-18 RX ADMIN — ACETAMINOPHEN 650 MG: 325 TABLET ORAL at 04:41

## 2017-04-18 RX ADMIN — ALBUTEROL SULFATE 2.5 MG: 2.5 SOLUTION RESPIRATORY (INHALATION) at 12:00

## 2017-04-18 NOTE — PROGRESS NOTES
Spoke with patient in regards to their CT Results. Stated that the pulmonary nodule was stable and that Morgan Sharif would like for the patient to repeat their CT scan in 6 months. Patient stated that they actually have a CT of the Chest scheduled in August and wanted to know if we could use that instead. Would that be acceptable? Please advise. // Nick BENÍTEZ

## 2017-04-18 NOTE — PROGRESS NOTES
Uzma Postin  Admission Date: 4/16/2017             Daily Progress Note: 4/18/2017    The patient's chart is reviewed and the patient is discussed with the staff. Subjective:      Feels like the mucus is much easier to clear with the mucomyst and increased dose of mucinex. Feels overwelmed with diagnosis since last summer. Asking about disability.      Current Facility-Administered Medications   Medication Dose Route Frequency    guaiFENesin ER (MUCINEX) tablet 1,200 mg  1,200 mg Oral BID    clonazePAM (KlonoPIN) tablet 1 mg  1 mg Oral Q8H PRN    acetylcysteine (MUCOMYST) 100 mg/mL (10 %) nebulizer solution 200 mg  2 mL Nebulization BID    lidocaine (XYLOCAINE) 2 % jelly   Mucous Membrane PRN    sodium chloride (NS) flush 5-10 mL  5-10 mL IntraVENous Q8H    sodium chloride (NS) flush 5-10 mL  5-10 mL IntraVENous PRN    0.9% sodium chloride infusion  25 mL/hr IntraVENous CONTINUOUS    sodium chloride (NS) flush 5-10 mL  5-10 mL IntraVENous PRN    albuterol (PROVENTIL VENTOLIN) nebulizer solution 2.5 mg  2.5 mg Nebulization QID RT    aspirin delayed-release tablet 81 mg  81 mg Oral DAILY    hydroCHLOROthiazide (HYDRODIURIL) tablet 12.5 mg  12.5 mg Oral DAILY    sertraline (ZOLOFT) tablet 50 mg  50 mg Oral DAILY    sodium chloride (NS) flush 5-10 mL  5-10 mL IntraVENous PRN    budesonide (PULMICORT) 500 mcg/2 ml nebulizer suspension  500 mcg Nebulization BID RT    predniSONE (DELTASONE) tablet 40 mg  40 mg Oral DAILY WITH BREAKFAST    levoFLOXacin (LEVAQUIN) 750 mg in D5W IVPB  750 mg IntraVENous Q24H    enoxaparin (LOVENOX) injection 40 mg  40 mg SubCUTAneous Q12H    insulin lispro (HUMALOG) injection   SubCUTAneous AC&HS    HYDROcodone-homatropine (HYCODAN) 5-1.5 mg/5 mL (5 mL) syrup 5 mL  5 mL Oral Q4H PRN    acetaminophen (TYLENOL) tablet 650 mg  650 mg Oral Q4H PRN         Objective:     Vitals:    04/18/17 0800 04/18/17 0814 04/18/17 1051 04/18/17 1143   BP: 135/73    Pulse:   88    Resp: 19  19    Temp:   98.3 °F (36.8 °C)    SpO2:  98% 95% 96%   Weight:       Height:         Intake and Output:           Physical Exam:   Constitutional:  the patient is well developed and in no acute distress  HEENT:  Sclera clear, pupils equal, oral mucosa moist  Lungs: no wheezing today. On room air  Cardiovascular:  RRR without M,G,R  Abd/GI: soft and non-tender; with positive bowel sounds. Ext: warm without cyanosis. There is no lower leg edema. Musculoskeletal: moves all four extremities with equal strength  Skin:  no jaundice or rashes, no wounds   Neuro: no gross neuro deficits   Musculoskeletal: can ambulate. No deformity  Psychiatric: Calm. ROS:  Good appetite, chronic dyspnea, fatigue  Lines: peripheral IV    CHEST XRAY: none today    LAB  Recent Labs      04/17/17   0500  04/16/17   1654   WBC  4.4  5.2   HGB  13.0  14.8   HCT  39.5  42.8   PLT  263  305     Recent Labs      04/17/17   0500  04/16/17   1654   NA  143  142   K  3.8  3.5   CL  108*  107   CO2  27  26   GLU  92  116*   BUN  9  10   CREA  0.80  0.88   ALB   --   3.9   SGOT   --   46*           Assessment:  (Medical Decision Making)     Hospital Problems  Date Reviewed: 4/16/2017          Codes Class Noted POA    * (Principal)Bronchiectasis with (acute) exacerbation ICD-10-CM: J47.1  ICD-9-CM: 494.1  4/16/2017 Yes    Bronch yesterday - thick tan colored secretions    Fever ICD-10-CM: R50.9  ICD-9-CM: 780.60  4/16/2017 Yes    None today.  Sputum cultures neg so far    Lung nodule < 6cm on CT ICD-10-CM: R91.1  ICD-9-CM: 793.11  4/5/2017 Yes    In surveillance    Sinus tachycardia ICD-10-CM: R00.0  ICD-9-CM: 427.89  3/28/2017 Yes    resolved    Diabetes mellitus type 2, controlled ([facility] (Chronic) ICD-10-CM: E11.9  ICD-9-CM: 250.00  8/24/2016 Yes    Controlled even with steroids    Morbid obesity ([facility] (Chronic) ICD-10-CM: E66.01  ICD-9-CM: 278.01  4/3/2015 Yes    Contributes to the complexity of care    Anxiety and depression (Chronic) ICD-10-CM: F41.9, F32.9  ICD-9-CM: 300.00, 311  4/3/2015 Yes    On home meds          Plan:  (Medical Decision Making)     1. Continue with mucomyst for now - office will look into getting this for home - prn use  2. Day 3 levaquin - sputum cultures neg so far  3. Will check overnight oximetry while she is here - may have sleep apnea but no current treatment  4. Monitor fever today - home tomorrow if afebrile  5. Prednisone 40 mg per day - start taper as wheezing is better  6. Using flutter valve here - has vest at home    Lali Zurita NP    More than 50% of time documented was spent face-to-face contact with the patient and in the care of the patient on the floor/unit where the patient is located. Lungs:  Scattered wheezes and rhonchi at the bases  Heart:  RRR with no Murmur/Rubs/Gallops    Additional Comments:  Has hx of nocturnal dyspnea, AM HA, EDS. Check CAROLYN on RA to screen for CHEYANNE. Don't see much bronchiectasis on latest CT. Bronch studies for the most part all pending. I have spoken with and examined the patient. I agree with the above assessment and plan as documented.     Marya Mo MD

## 2017-04-19 PROBLEM — J09.X2 INFLUENZA A (H5N1): Status: ACTIVE | Noted: 2017-04-19

## 2017-04-19 LAB
BACTERIA SPEC CULT: NORMAL
BACTERIA SPEC CULT: NORMAL
C. PNEUMONAIE, CPPT: NOT DETECTED
GLUCOSE BLD STRIP.AUTO-MCNC: 125 MG/DL (ref 65–100)
GLUCOSE BLD STRIP.AUTO-MCNC: 133 MG/DL (ref 65–100)
GLUCOSE BLD STRIP.AUTO-MCNC: 145 MG/DL (ref 65–100)
GLUCOSE BLD STRIP.AUTO-MCNC: 96 MG/DL (ref 65–100)
GRAM STN SPEC: NORMAL
M. PNEUMONIAE, MPPT: NOT DETECTED
SERVICE CMNT-IMP: NORMAL
SERVICE CMNT-IMP: NORMAL

## 2017-04-19 PROCEDURE — 74011250636 HC RX REV CODE- 250/636: Performed by: INTERNAL MEDICINE

## 2017-04-19 PROCEDURE — 94760 N-INVAS EAR/PLS OXIMETRY 1: CPT

## 2017-04-19 PROCEDURE — 94762 N-INVAS EAR/PLS OXIMTRY CONT: CPT

## 2017-04-19 PROCEDURE — 74011250637 HC RX REV CODE- 250/637: Performed by: NURSE PRACTITIONER

## 2017-04-19 PROCEDURE — 74011000250 HC RX REV CODE- 250: Performed by: INTERNAL MEDICINE

## 2017-04-19 PROCEDURE — 94640 AIRWAY INHALATION TREATMENT: CPT

## 2017-04-19 PROCEDURE — 74011636637 HC RX REV CODE- 636/637: Performed by: NURSE PRACTITIONER

## 2017-04-19 PROCEDURE — 65270000029 HC RM PRIVATE

## 2017-04-19 PROCEDURE — 82962 GLUCOSE BLOOD TEST: CPT

## 2017-04-19 PROCEDURE — 74011250637 HC RX REV CODE- 250/637: Performed by: INTERNAL MEDICINE

## 2017-04-19 PROCEDURE — 99232 SBSQ HOSP IP/OBS MODERATE 35: CPT | Performed by: INTERNAL MEDICINE

## 2017-04-19 RX ORDER — DEXAMETHASONE SODIUM PHOSPHATE 4 MG/ML
8 INJECTION, SOLUTION INTRA-ARTICULAR; INTRALESIONAL; INTRAMUSCULAR; INTRAVENOUS; SOFT TISSUE ONCE
Status: COMPLETED | OUTPATIENT
Start: 2017-04-19 | End: 2017-04-19

## 2017-04-19 RX ADMIN — ALBUTEROL SULFATE 2.5 MG: 2.5 SOLUTION RESPIRATORY (INHALATION) at 20:54

## 2017-04-19 RX ADMIN — SERTRALINE HYDROCHLORIDE 50 MG: 50 TABLET ORAL at 08:02

## 2017-04-19 RX ADMIN — BUDESONIDE 500 MCG: 0.5 INHALANT RESPIRATORY (INHALATION) at 20:54

## 2017-04-19 RX ADMIN — ALBUTEROL SULFATE 2.5 MG: 2.5 SOLUTION RESPIRATORY (INHALATION) at 15:43

## 2017-04-19 RX ADMIN — Medication 5 ML: at 13:05

## 2017-04-19 RX ADMIN — GUAIFENESIN 1200 MG: 600 TABLET, EXTENDED RELEASE ORAL at 08:02

## 2017-04-19 RX ADMIN — LEVOFLOXACIN 750 MG: 500 TABLET, FILM COATED ORAL at 21:36

## 2017-04-19 RX ADMIN — ACETYLCYSTEINE 200 MG: 100 SOLUTION ORAL; RESPIRATORY (INHALATION) at 20:54

## 2017-04-19 RX ADMIN — PREDNISONE 30 MG: 10 TABLET ORAL at 08:02

## 2017-04-19 RX ADMIN — CLONAZEPAM 1 MG: 1 TABLET ORAL at 21:36

## 2017-04-19 RX ADMIN — ENOXAPARIN SODIUM 40 MG: 40 INJECTION SUBCUTANEOUS at 08:02

## 2017-04-19 RX ADMIN — Medication 5 ML: at 21:36

## 2017-04-19 RX ADMIN — ALBUTEROL SULFATE 2.5 MG: 2.5 SOLUTION RESPIRATORY (INHALATION) at 08:39

## 2017-04-19 RX ADMIN — BUDESONIDE 500 MCG: 0.5 INHALANT RESPIRATORY (INHALATION) at 08:38

## 2017-04-19 RX ADMIN — DEXAMETHASONE SODIUM PHOSPHATE 8 MG: 4 INJECTION, SOLUTION INTRAMUSCULAR; INTRAVENOUS at 15:12

## 2017-04-19 RX ADMIN — ASPIRIN 81 MG: 81 TABLET, COATED ORAL at 08:03

## 2017-04-19 RX ADMIN — GUAIFENESIN 1200 MG: 600 TABLET, EXTENDED RELEASE ORAL at 21:36

## 2017-04-19 RX ADMIN — ACETYLCYSTEINE 200 MG: 100 SOLUTION ORAL; RESPIRATORY (INHALATION) at 08:38

## 2017-04-19 RX ADMIN — HYDROCHLOROTHIAZIDE 12.5 MG: 25 TABLET ORAL at 08:03

## 2017-04-19 RX ADMIN — ALBUTEROL SULFATE 2.5 MG: 2.5 SOLUTION RESPIRATORY (INHALATION) at 11:09

## 2017-04-19 RX ADMIN — ENOXAPARIN SODIUM 40 MG: 40 INJECTION SUBCUTANEOUS at 21:35

## 2017-04-19 NOTE — PROGRESS NOTES
Maynor Cheng  Admission Date: 4/16/2017             Daily Progress Note: 4/19/2017    The patient's chart is reviewed and the patient is discussed with the staff. Admitted with congestion/fever. Underwent bronchoscopy for mucus plugging. Viral panel positive for influenza A. Sputum cultures neg. Fever now resolved. Has vest at home. Maintained on Advair and albuterol. ? CHEYANNE - overnight oximetry showed no desaturations but she did not sleep well. Subjective:     Did not sleep well last night - maybe 2 hours. Steroids have her wound up. Questions about discharge. Worried about congestion.      Current Facility-Administered Medications   Medication Dose Route Frequency    predniSONE (DELTASONE) tablet 30 mg  30 mg Oral DAILY WITH BREAKFAST    guaiFENesin ER (MUCINEX) tablet 1,200 mg  1,200 mg Oral BID    clonazePAM (KlonoPIN) tablet 1 mg  1 mg Oral Q8H PRN    acetylcysteine (MUCOMYST) 100 mg/mL (10 %) nebulizer solution 200 mg  2 mL Nebulization BID    lidocaine (XYLOCAINE) 2 % jelly   Mucous Membrane PRN    sodium chloride (NS) flush 5-10 mL  5-10 mL IntraVENous Q8H    sodium chloride (NS) flush 5-10 mL  5-10 mL IntraVENous PRN    sodium chloride (NS) flush 5-10 mL  5-10 mL IntraVENous PRN    albuterol (PROVENTIL VENTOLIN) nebulizer solution 2.5 mg  2.5 mg Nebulization QID RT    aspirin delayed-release tablet 81 mg  81 mg Oral DAILY    hydroCHLOROthiazide (HYDRODIURIL) tablet 12.5 mg  12.5 mg Oral DAILY    sertraline (ZOLOFT) tablet 50 mg  50 mg Oral DAILY    sodium chloride (NS) flush 5-10 mL  5-10 mL IntraVENous PRN    budesonide (PULMICORT) 500 mcg/2 ml nebulizer suspension  500 mcg Nebulization BID RT    levoFLOXacin (LEVAQUIN) 750 mg in D5W IVPB  750 mg IntraVENous Q24H    enoxaparin (LOVENOX) injection 40 mg  40 mg SubCUTAneous Q12H    insulin lispro (HUMALOG) injection   SubCUTAneous AC&HS    HYDROcodone-homatropine (HYCODAN) 5-1.5 mg/5 mL (5 mL) syrup 5 mL  5 mL Oral Q4H PRN    acetaminophen (TYLENOL) tablet 650 mg  650 mg Oral Q4H PRN         Objective:     Vitals:    04/18/17 2300 04/19/17 0346 04/19/17 0800 04/19/17 0839   BP: 132/64 (!) 138/91 108/66    Pulse: 76 77 78    Resp: 19 19 19    Temp: 98.9 °F (37.2 °C) 98.1 °F (36.7 °C) 98.3 °F (36.8 °C)    SpO2: 93% 96% 95% 97%   Weight:       Height:         Intake and Output:           Physical Exam:   Constitutional:  the patient is well developed and in no acute distress  HEENT:  Sclera clear, pupils equal, oral mucosa moist  Lungs: wheezing bilaterally. On room air. Cough has been more productive with mucolytics  Cardiovascular:  RRR without M,G,R  Abd/GI: soft and non-tender; with positive bowel sounds. Ext: warm without cyanosis. There is no lower leg edema. Musculoskeletal: moves all four extremities with equal strength  Skin:  no jaundice or rashes, no wounds   Neuro: no gross neuro deficits   Musculoskeletal: can ambulate. No deformity  Psychiatric: Calm. ROS:  Anxious about respiratory status, chronic fatigue, chronic dyspnea  Lines: peripheral IV    CHEST XRAY: none today    LAB  Recent Labs      04/17/17   0500  04/16/17   1654   WBC  4.4  5.2   HGB  13.0  14.8   HCT  39.5  42.8   PLT  263  305     Recent Labs      04/17/17   0500  04/16/17   1654   NA  143  142   K  3.8  3.5   CL  108*  107   CO2  27  26   GLU  92  116*   BUN  9  10   CREA  0.80  0.88   ALB   --   3.9   SGOT   --   46*       Assessment:  (Medical Decision Making)     Hospital Problems  Date Reviewed: 4/16/2017          Codes Class Noted POA    Influenza A (H5N1) ICD-10-CM: J09. X2  ICD-9-CM: 488.02  4/19/2017 Yes    Noted on recent viral panel. Fever now resolved    Suspected sleep apnea ICD-10-CM: G47.30  ICD-9-CM: 780.57  4/18/2017 Yes    No desaturations on overnight oximetry but she did not sleep well.  Chronic fatigue with nocturnal dyspnea    * (Principal)Bronchiectasis with (acute) exacerbation ICD-10-CM: J47.1  ICD-9-CM: 494.1 4/16/2017 Yes    Wheezing on exam today. Some cough. Secretions easier to clear with mucolytics    Fever ICD-10-CM: R50.9  ICD-9-CM: 780.60  4/16/2017 Yes    None for past 36 hours    Lung nodule < 6cm on CT ICD-10-CM: R91.1  ICD-9-CM: 793.11  4/5/2017 Yes    Will need surveillance    Sinus tachycardia ICD-10-CM: R00.0  ICD-9-CM: 427.89  3/28/2017 Yes    resolved    Diabetes mellitus type 2, controlled ([facility] [address] (Chronic) ICD-10-CM: E11.9  ICD-9-CM: 250.00  8/24/2016 Yes    controlled    Morbid obesity ([facility] [address] (Chronic) ICD-10-CM: E66.01  ICD-9-CM: 278.01  4/3/2015 Yes    Contributes to the complexity of care    Anxiety and depression (Chronic) ICD-10-CM: F41.9, F32.9  ICD-9-CM: 300.00, 311  4/3/2015 Yes    On home meds          Plan:  (Medical Decision Making)   1. Wheezing on exam - on prednisone. Decreased dose yesterday - plan to continue with slow taper. She has not had an IgE level - consider doing after she is off the steroids - ? Consider Xolair  2. Inhaled steroids and bronchodilators with mucolytics. On at home and also has vest. Most recent CT did not show bronchiectasis like the original one last August. She has ongoing problem with congestion and mucus. Continue with home therapy and now has flutter valve  3. Day 4 levaquin. Cultures with normal ann marie. 4. Outpatient sleep evaluation for suspected CHEYANNE  5. Outpatient follow up for pulmonary nodule. CT scheduled for August 6. ? Home today    Marisol Metz NP    More than 50% of time documented was spent face-to-face contact with the patient and in the care of the patient on the floor/unit where the patient is located. Lungs:  Exp. wheezes  Heart:  RRR with no Murmur/Rubs/Gallops    Additional Comments: Will give a dose iv steroids- home tomorrow    I have spoken with and examined the patient. I agree with the above assessment and plan as documented.     Sandoval Campos MD

## 2017-04-20 VITALS
WEIGHT: 240 LBS | HEART RATE: 87 BPM | BODY MASS INDEX: 42.52 KG/M2 | RESPIRATION RATE: 20 BRPM | OXYGEN SATURATION: 94 % | HEIGHT: 63 IN | DIASTOLIC BLOOD PRESSURE: 68 MMHG | SYSTOLIC BLOOD PRESSURE: 134 MMHG | TEMPERATURE: 98.9 F

## 2017-04-20 LAB
GLUCOSE BLD STRIP.AUTO-MCNC: 134 MG/DL (ref 65–100)
GLUCOSE BLD STRIP.AUTO-MCNC: 139 MG/DL (ref 65–100)
GLUCOSE BLD STRIP.AUTO-MCNC: 95 MG/DL (ref 65–100)

## 2017-04-20 PROCEDURE — 74011250637 HC RX REV CODE- 250/637: Performed by: INTERNAL MEDICINE

## 2017-04-20 PROCEDURE — 74011636637 HC RX REV CODE- 636/637: Performed by: NURSE PRACTITIONER

## 2017-04-20 PROCEDURE — 82962 GLUCOSE BLOOD TEST: CPT

## 2017-04-20 PROCEDURE — 74011250636 HC RX REV CODE- 250/636: Performed by: INTERNAL MEDICINE

## 2017-04-20 PROCEDURE — 94760 N-INVAS EAR/PLS OXIMETRY 1: CPT

## 2017-04-20 PROCEDURE — 99239 HOSP IP/OBS DSCHRG MGMT >30: CPT | Performed by: INTERNAL MEDICINE

## 2017-04-20 PROCEDURE — 74011000250 HC RX REV CODE- 250: Performed by: INTERNAL MEDICINE

## 2017-04-20 PROCEDURE — 74011250637 HC RX REV CODE- 250/637: Performed by: NURSE PRACTITIONER

## 2017-04-20 PROCEDURE — 94640 AIRWAY INHALATION TREATMENT: CPT

## 2017-04-20 RX ORDER — ALBUTEROL SULFATE 0.83 MG/ML
2.5 SOLUTION RESPIRATORY (INHALATION) 4 TIMES DAILY
Qty: 24 EACH | Refills: 11 | Status: SHIPPED | OUTPATIENT
Start: 2017-04-20 | End: 2017-05-19 | Stop reason: SDUPTHER

## 2017-04-20 RX ORDER — GUAIFENESIN 600 MG/1
1200 TABLET, EXTENDED RELEASE ORAL 2 TIMES DAILY
Qty: 1 TAB | Refills: 0 | Status: SHIPPED
Start: 2017-04-20

## 2017-04-20 RX ORDER — PREDNISONE 10 MG/1
10 TABLET ORAL DAILY
Qty: 18 TAB | Refills: 0 | Status: SHIPPED | OUTPATIENT
Start: 2017-04-20 | End: 2017-05-02

## 2017-04-20 RX ADMIN — ALBUTEROL SULFATE 2.5 MG: 2.5 SOLUTION RESPIRATORY (INHALATION) at 07:11

## 2017-04-20 RX ADMIN — LEVOFLOXACIN 750 MG: 500 TABLET, FILM COATED ORAL at 14:06

## 2017-04-20 RX ADMIN — PREDNISONE 30 MG: 10 TABLET ORAL at 08:24

## 2017-04-20 RX ADMIN — ALBUTEROL SULFATE 2.5 MG: 2.5 SOLUTION RESPIRATORY (INHALATION) at 15:24

## 2017-04-20 RX ADMIN — ACETYLCYSTEINE 200 MG: 100 SOLUTION ORAL; RESPIRATORY (INHALATION) at 07:10

## 2017-04-20 RX ADMIN — BUDESONIDE 500 MCG: 0.5 INHALANT RESPIRATORY (INHALATION) at 07:11

## 2017-04-20 RX ADMIN — ENOXAPARIN SODIUM 40 MG: 40 INJECTION SUBCUTANEOUS at 08:24

## 2017-04-20 RX ADMIN — Medication 5 ML: at 14:08

## 2017-04-20 RX ADMIN — ASPIRIN 81 MG: 81 TABLET, COATED ORAL at 08:24

## 2017-04-20 RX ADMIN — HYDROCHLOROTHIAZIDE 12.5 MG: 25 TABLET ORAL at 08:24

## 2017-04-20 RX ADMIN — SERTRALINE HYDROCHLORIDE 50 MG: 50 TABLET ORAL at 08:24

## 2017-04-20 RX ADMIN — ALBUTEROL SULFATE 2.5 MG: 2.5 SOLUTION RESPIRATORY (INHALATION) at 11:00

## 2017-04-20 RX ADMIN — GUAIFENESIN 1200 MG: 600 TABLET, EXTENDED RELEASE ORAL at 08:24

## 2017-04-20 NOTE — DISCHARGE SUMMARY
Discharge Note    Patient: Morales Haji                 MRN: [id number]                      YOB: 1975   Age: 42 y.o. Sex: female                             Admission date:  4/16/2017  Discharge date:  4/20/2017    Admitting Diagnosis:  Bronchiectasis with acute exacerbation [facility]) [J47.1]    Discharge Diagnoses:    Hospital Problems as of 4/20/2017  Date Reviewed: 4/16/2017          Codes Class Noted - Resolved POA    Influenza A (H5N1) ICD-10-CM: J09. X2  ICD-9-CM: 488.02  4/19/2017 - Present Yes        Suspected sleep apnea ICD-10-CM: G47.30  ICD-9-CM: 780.57  4/18/2017 - Present Yes        * (Principal)Bronchiectasis with (acute) exacerbation ICD-10-CM: J47.1  ICD-9-CM: 494.1  4/16/2017 - Present Yes        Fever ICD-10-CM: R50.9  ICD-9-CM: 780.60  4/16/2017 - Present Yes        Lung nodule < 6cm on CT ICD-10-CM: R91.1  ICD-9-CM: 793.11  4/5/2017 - Present Yes        Sinus tachycardia ICD-10-CM: R00.0  ICD-9-CM: 427.89  3/28/2017 - Present Yes        Diabetes mellitus type 2, controlled ([facility] [address] (Chronic) ICD-10-CM: E11.9  ICD-9-CM: 250.00  8/24/2016 - Present Yes        Morbid obesity ([facility] [address] (Chronic) ICD-10-CM: E66.01  ICD-9-CM: 278.01  4/3/2015 - Present Yes        Anxiety and depression (Chronic) ICD-10-CM: F41.9, F32.9  ICD-9-CM: 300.00, 311  4/3/2015 - Present Yes              Consultants: none    Studies/Procedures:  CXR  Bronchoscopy      Disposition: Home  Dicharged Condition: stable    Hospital course: Admitted with cough, congestion and fever. She has known asthma and was diagnosed with bronchiectasis last and is using a home vest BID to help with secretion clearance. She was started on Levaquin and a sputum culture obtained which grew normal ann marie. She had ongoing problems with secretion clearance so she underwent bronchoscopy. Mucinex and mucomyst were used here. Bronchial washings grew normal ann marie. To note is that the viral panel was positive for Influenza A.  She was not treated with Tamiflu as her fever had resolved and it was felt to be late in its course. She has completed a 5 day course of levaquin. She continues to wheeze so is going home on a steroid taper. She is maintained on Advair and Albuterol. There is no IgE level for review. Xolair has been mentioned in the past and this can be reconsidered as an outpatient. She is very anxious about dealing with her lung disease long term. She asked about disability but I have explained that I did not think she would qualify. She describes chronic fatigue and nocturnal dyspnea. She may have sleep apnea so an outpatient sleep evaluation has been scheduled. An overnight oximetry was done in the hospital that did not show any significant desaturations but she did not sleep well due to steroid therapy. She is being followed for a pulmonary nodule - repeat CT scan is scheduled for August. The nodule initially measured 5 mm and more recently measured 8 mm. Interestingly, the last CT scan did not show the bronchiectasis and this information was shared with the patient but she continues to dwell on the diagnosis and have concerns about how well she will do at home with all of her lung problems. Physical Exam:   Visit Vitals    /68    Pulse 87    Temp 98.9 °F (37.2 °C)    Resp 20    Ht 5' 3\" (1.6 m)    Wt 240 lb (108.9 kg)    SpO2 94%    BMI 42.51 kg/m2     Constitutional:  the patient is well developed and in no acute distress  HEENT:  Sclera clear, pupils equal, oral mucosa moist  Respiratory: wheezing and crackles bilaterally. On room air  Cardiovascular:  RRR without M,G,R  Abd/GI: soft and non-tender; with positive bowel sounds. Ext: warm without cyanosis. There is no lower leg edema.    Musculoskeletal: moves all four extremities with equal strength  Skin:  no jaundice or rashes, no wounds   Neuro: no gross neuro deficits     Psychiatric:  alert and oriented     LAB  Lab Results   Component Value Date/Time Sodium 143 04/17/2017 05:00 AM    Potassium 3.8 04/17/2017 05:00 AM    Chloride 108 04/17/2017 05:00 AM    CO2 27 04/17/2017 05:00 AM    Anion gap 8 04/17/2017 05:00 AM    Glucose 92 04/17/2017 05:00 AM    BUN 9 04/17/2017 05:00 AM    Creatinine 0.80 04/17/2017 05:00 AM    BUN/Creatinine ratio 13 12/05/2016 10:07 AM    GFR est AA >60 04/17/2017 05:00 AM    GFR est non-AA >60 04/17/2017 05:00 AM    Calcium 8.2 04/17/2017 05:00 AM     Lab Results   Component Value Date/Time    WBC 4.4 04/17/2017 05:00 AM    HGB 13.0 04/17/2017 05:00 AM    HCT 39.5 04/17/2017 05:00 AM    PLATELET 684 92/57/8787 05:00 AM    MCV 97.3 04/17/2017 05:00 AM       Discharge Medications:   Current Discharge Medication List      START taking these medications    Details   predniSONE (DELTASONE) 10 mg tablet  Take 30 mg (3 tablets) per day for 3 days, 20 mg (2 tablets) per day for 3 days then 10 mg (1 tablet) per day for 3 days then stop  Qty: 18 Tab, Refills: 0         CONTINUE these medications which have CHANGED    Details   albuterol (PROVENTIL VENTOLIN) 2.5 mg /3 mL (0.083 %) nebulizer solution 3 mL by Nebulization route four (4) times daily. Qty: 24 Each, Refills: 11      guaiFENesin ER (MUCINEX) 600 mg ER tablet Take 2 Tabs by mouth two (2) times a day. Qty: 1 Tab, Refills: 0         CONTINUE these medications which have NOT CHANGED    Details   clonazePAM (KLONOPIN) 1 mg tablet Take 1 Tab by mouth nightly as needed. Max Daily Amount: 1 mg. Qty: 30 Tab, Refills: 1    Associated Diagnoses: Anxiety and depression      ondansetron hcl (ZOFRAN, AS HYDROCHLORIDE,) 8 mg tablet Take 1 Tab by mouth every eight (8) hours as needed for Nausea. Qty: 15 Tab, Refills: 0      fluticasone-salmeterol (ADVAIR DISKUS) 250-50 mcg/dose diskus inhaler Take 1 Puff by inhalation two (2) times a day. Qty: 1 Inhaler, Refills: 11      albuterol (PROAIR HFA) 90 mcg/actuation inhaler Take 2 Puffs by inhalation every four (4) hours as needed.   Qty: 1 Inhaler, Refills: 11      hydroCHLOROthiazide (HYDRODIURIL) 12.5 mg tablet Take 1 Tab by mouth daily. Qty: 30 Tab, Refills: 12    Associated Diagnoses: Peripheral edema      multivitamin (ONE A DAY) tablet Take 1 Tab by mouth daily. aspirin 81 mg tablet Take 81 mg by mouth. sertraline (ZOLOFT) 50 mg tablet Take 1 Tab by mouth daily. Qty: 30 Tab, Refills: 6    Associated Diagnoses: Anxiety and depression         STOP taking these medications       clindamycin (CLEOCIN) 300 mg capsule Comments:   Reason for Stopping: has completed              Followup/Outpt Studies:  Activity: Activity as tolerated  Diet: Regular Diet  Wound Care: None needed      Follow-up Information     Follow up With Details Comments Contact Info    Jinnie Claude, MD [address]  [phone]            --Follow up appointment with [facility] in 1 to 2 weeks   --outpatient sleep evaluation  --follow up CT scan in August to reevaluate nodule    Taiwo Silverman NP  April 20, 2017    --Total discharge greater than 30 minutes in duration.     More than 50% of time documented was spent face-to-face contact with the patient and in the care of the patient on the floor/unit where the patient is located

## 2017-04-20 NOTE — DISCHARGE INSTRUCTIONS
Activity: Activity as tolerated    Diet: Regular Diet    Break the smoking habit  Enroll in a stop-smoking program to increase your chances of success. Ask your healthcare provider about medicines or other methods to help you quit. Ask family members to quit smoking as well. Dont allow smoking in your home or around you. Other home care       Continue using chest vest as prescribed  Learn to do controlled breathing. Take your medicine exactly as directed. Dont skip doses or take double doses. Eat a balanced diet. Unless told otherwise, drink at least 8 glasses of fluid every day to thin mucus and secretions. Maintain a healthy weight. Get help to lose any extra pounds. Get a flu shot every year. Ask your healthcare provider about pneumonia vaccinations. Avoid allergens, pollution, and dust.  Get immediate medical treatment for infections. Consider installing a home air-conditioning system with a filter and humidity control. Follow-up care  Make a follow-up appointment with your healthcare provider, as advised. Call 911  Call 911 right away if you have  Chest pain  Shortness of breath that doesn't get better after taking medicine  When to seek medical attention  Call your healthcare provider right away if you have;  Fever of 100.4°F (38°C) or higher, or as directed by your healthcare provider  Yellow, green, bloody, or smelly mucus  More than normal mucus production               Influenza (Flu): Care Instructions  Your Care Instructions  Influenza (flu) is an infection in the lungs and breathing passages. It is caused by the influenza virus. There are different strains, or types, of the flu virus from year to year. Unlike the common cold, the flu comes on suddenly and the symptoms, such as a cough, congestion, fever, chills, fatigue, aches, and pains, are more severe. These symptoms may last up to 10 days.  Although the flu can make you feel very sick, it usually doesn't cause serious health problems. Home treatment is usually all you need for flu symptoms. But your doctor may prescribe antiviral medicine to prevent other health problems, such as pneumonia, from developing. Older people and those who have a long-term health condition, such as lung disease, are most at risk for having pneumonia or other health problems. Follow-up care is a key part of your treatment and safety. Be sure to make and go to all appointments, and call your doctor if you are having problems. Its also a good idea to know your test results and keep a list of the medicines you take. How can you care for yourself at home? · Get plenty of rest.  · Drink plenty of fluids, enough so that your urine is light yellow or clear like water. If you have kidney, heart, or liver disease and have to limit fluids, talk with your doctor before you increase the amount of fluids you drink. · Take an over-the-counter pain medicine if needed, such as acetaminophen (Tylenol), ibuprofen (Advil, Motrin), or naproxen (Aleve), to relieve fever, headache, and muscle aches. Read and follow all instructions on the label. No one younger than 20 should take aspirin. It has been linked to Reye syndrome, a serious illness. · Do not smoke. Smoking can make the flu worse. If you need help quitting, talk to your doctor about stop-smoking programs and medicines. These can increase your chances of quitting for good. · Breathe moist air from a hot shower or from a sink filled with hot water to help clear a stuffy nose. · Before you use cough and cold medicines, check the label. These medicines may not be safe for young children or for people with certain health problems. · If the skin around your nose and lips becomes sore, put some petroleum jelly on the area. · To ease coughing:  ¨ Drink fluids to soothe a scratchy throat. ¨ Suck on cough drops or plain hard candy.   ¨ Take an over-the-counter cough medicine that contains dextromethorphan to help you get some sleep. Read and follow all instructions on the label. ¨ Raise your head at night with an extra pillow. This may help you rest if coughing keeps you awake. · Take any prescribed medicine exactly as directed. Call your doctor if you think you are having a problem with your medicine. To avoid spreading the flu  · Wash your hands regularly, and keep your hands away from your face. · Stay home from school, work, and other public places until you are feeling better and your fever has been gone for at least 24 hours. The fever needs to have gone away on its own without the help of medicine. · Ask people living with you to talk to their doctors about preventing the flu. They may get antiviral medicine to keep from getting the flu from you. · To prevent the flu in the future, get a flu vaccine every fall. Encourage people living with you to get the vaccine. · Cover your mouth when you cough or sneeze. When should you call for help? Call 911 anytime you think you may need emergency care. For example, call if:  · You have severe trouble breathing. Call your doctor now or seek immediate medical care if:  · You have new or worse trouble breathing. · You seem to be getting much sicker. · You feel very sleepy or confused. · You have a new or higher fever. · You get a new rash. Watch closely for changes in your health, and be sure to contact your doctor if:  · You begin to get better and then get worse. · You are not getting better after 1 week. Where can you learn more? Go to http://enrique-miranda.info/. Enter L717 in the search box to learn more about \"Influenza (Flu): Care Instructions. \"  Current as of: May 23, 2016  Content Version: 11.2  © 3415-6580 [employer]. Care instructions adapted under license by [employer] (which disclaims liability or warranty for this information).  If you have questions about a medical condition or this instruction, always ask your healthcare professional. [address] any warranty or liability for your use of this information. Asthma Attack: Care Instructions  Your Care Instructions    During an asthma attack, the airways swell and narrow. This makes it hard to breathe. Severe asthma attacks can be life-threatening, but you can help prevent them by keeping your asthma under control and treating symptoms before they get bad. Symptoms include being short of breath, having chest tightness, coughing, and wheezing. Noting and treating these symptoms can also help you avoid future trips to the emergency room. The doctor has checked you carefully, but problems can develop later. If you notice any problems or new symptoms, get medical treatment right away. Follow-up care is a key part of your treatment and safety. Be sure to make and go to all appointments, and call your doctor if you are having problems. It's also a good idea to know your test results and keep a list of the medicines you take. How can you care for yourself at home? · Follow your asthma action plan to prevent and treat attacks. If you don't have an asthma action plan, work with your doctor to create one. · Take your asthma medicines exactly as prescribed. Talk to your doctor right away if you have any questions about how to take them. ¨ Use your quick-relief medicine when you have symptoms of an attack. Quick-relief medicine is usually an albuterol inhaler. Some people need to use quick-relief medicine before they exercise. ¨ Take your controller medicine every day, not just when you have symptoms. Controller medicine is usually an inhaled corticosteroid. The goal is to prevent problems before they occur. Don't use your controller medicine to treat an attack that has already started. It doesn't work fast enough to help. ¨ If your doctor prescribed corticosteroid pills to use during an attack, take them exactly as prescribed.  It may take hours for the pills to work, but they may make the episode shorter and help you breathe better. ¨ Keep your quick-relief medicine with you at all times. · Talk to your doctor before using other medicines. Some medicines, such as aspirin, can cause asthma attacks in some people. · If you have a peak flow meter, use it to check how well you are breathing. This can help you predict when an asthma attack is going to occur. Then you can take medicine to prevent the asthma attack or make it less severe. · Do not smoke or allow others to smoke around you. Avoid smoky places. Smoking makes asthma worse. If you need help quitting, talk to your doctor about stop-smoking programs and medicines. These can increase your chances of quitting for good. · Learn what triggers an asthma attack for you, and avoid the triggers when you can. Common triggers include colds, smoke, air pollution, dust, pollen, mold, pets, cockroaches, stress, and cold air. · Avoid colds and the flu. Get a pneumococcal vaccine shot. If you have had one before, ask your doctor if you need a second dose. Get a flu vaccine every fall. If you must be around people with colds or the flu, wash your hands often. When should you call for help? Call 911 anytime you think you may need emergency care. For example, call if:  · You have severe trouble breathing. Call your doctor now or seek immediate medical care if:  · Your symptoms do not get better after you have followed your asthma action plan. · You have new or worse trouble breathing. · Your coughing and wheezing get worse. · You cough up dark brown or bloody mucus (sputum). · You have a new or higher fever. Watch closely for changes in your health, and be sure to contact your doctor if:  · You need to use quick-relief medicine on more than 2 days a week (unless it is just for exercise). · You cough more deeply or more often, especially if you notice more mucus or a change in the color of your mucus.   · You are not getting better as expected. Where can you learn more? Go to http://enrique-mirnada.info/. Enter B189 in the search box to learn more about \"Asthma Attack: Care Instructions. \"  Current as of: May 23, 2016  Content Version: 11.2  © 8296-2469 [employer], [facility]. Care instructions adapted under license by [employer] (which disclaims liability or warranty for this information). If you have questions about a medical condition or this instruction, always ask your healthcare professional. [address] any warranty or liability for your use of this information.

## 2017-04-20 NOTE — PROGRESS NOTES
Discharge instructions, follow up appointment and prescriptions reviewed and explained to the patient. Patient verbalizes understanding of instructions. A copy of discharge instructions and prescriptions  have been given to patient. Opportunity for questions provided.   PATIENT EXPECTS FAMILY TO ARRIVE ABOUT 1630

## 2017-04-20 NOTE — PROGRESS NOTES
Per Timmy Hahn [facility] NP, pt's heparin drip should be restarted at previous rate and then discontinued at 1630. See MAR.

## 2017-05-16 LAB
FUNGUS CULTURE, RFCO2T: NEGATIVE
FUNGUS SMEAR, RFCO1T: NORMAL
FUNGUS SPEC CULT: NORMAL
FUNGUS STAIN, 188244: NORMAL
REFLEX TO ID, RFCO3T: NORMAL
SPECIMEN SOURCE: NORMAL
SPECIMEN SOURCE: NORMAL

## 2017-05-31 LAB
ACID FAST STN SPEC: NEGATIVE
ACID FAST STN SPEC: NEGATIVE
MYCOBACTERIUM SPEC QL CULT: NEGATIVE
MYCOBACTERIUM SPEC QL CULT: NEGATIVE
SPECIMEN PREPARATION: NORMAL
SPECIMEN PREPARATION: NORMAL
SPECIMEN SOURCE: NORMAL
SPECIMEN SOURCE: NORMAL

## 2017-06-30 ENCOUNTER — HOSPITAL ENCOUNTER (OUTPATIENT)
Dept: MRI IMAGING | Age: 42
Discharge: HOME | End: 2017-06-30
Attending: INTERNAL MEDICINE
Payer: COMMERCIAL

## 2017-06-30 DIAGNOSIS — G44.85 PRIMARY STABBING HEADACHE: ICD-10-CM

## 2017-06-30 PROCEDURE — 70551 MRI BRAIN STEM W/O DYE: CPT

## 2017-07-26 PROBLEM — R60.9 PERIPHERAL EDEMA: Status: ACTIVE | Noted: 2017-07-26

## 2017-07-26 PROBLEM — R50.9 FEVER: Status: RESOLVED | Noted: 2017-04-16 | Resolved: 2017-07-26

## 2017-09-22 ENCOUNTER — HOSPITAL ENCOUNTER (OUTPATIENT)
Dept: CT IMAGING | Age: 42
Discharge: HOME | End: 2017-09-22
Payer: COMMERCIAL

## 2017-09-22 PROCEDURE — 71250 CT THORAX DX C-: CPT

## 2017-10-24 PROBLEM — R00.2 HEART PALPITATIONS: Status: ACTIVE | Noted: 2017-10-24

## 2017-10-24 PROBLEM — R29.818 SUSPECTED SLEEP APNEA: Status: RESOLVED | Noted: 2017-04-18 | Resolved: 2017-10-24

## 2017-10-27 ENCOUNTER — HOSPITAL ENCOUNTER (OUTPATIENT)
Dept: NON INVASIVE DIAGNOSTICS | Age: 42
Discharge: HOME | End: 2017-10-27
Payer: COMMERCIAL

## 2017-10-27 DIAGNOSIS — F41.9 ANXIETY AND DEPRESSION: Chronic | ICD-10-CM

## 2017-10-27 DIAGNOSIS — F32.A ANXIETY AND DEPRESSION: Chronic | ICD-10-CM

## 2017-10-27 PROCEDURE — 93306 TTE W/DOPPLER COMPLETE: CPT

## 2017-10-30 PROBLEM — G43.009 MIGRAINE WITHOUT AURA AND WITHOUT STATUS MIGRAINOSUS, NOT INTRACTABLE: Status: ACTIVE | Noted: 2017-10-30

## 2017-10-30 PROBLEM — M79.2 ATYPICAL NEURALGIA: Status: ACTIVE | Noted: 2017-10-30

## 2017-10-30 PROBLEM — Z79.899 ENCOUNTER FOR MEDICATION MANAGEMENT: Status: ACTIVE | Noted: 2017-10-30

## 2017-10-30 PROBLEM — F51.04 PSYCHOPHYSIOLOGICAL INSOMNIA: Status: ACTIVE | Noted: 2017-10-30

## 2017-11-06 PROBLEM — E04.2 MULTINODULAR GOITER: Status: ACTIVE | Noted: 2017-11-06

## 2017-11-06 PROBLEM — R13.14 PHARYNGOESOPHAGEAL DYSPHAGIA: Status: ACTIVE | Noted: 2017-11-06

## 2018-03-01 ENCOUNTER — HOSPITAL ENCOUNTER (OUTPATIENT)
Dept: CT IMAGING | Age: 43
Discharge: HOME | End: 2018-03-01
Payer: COMMERCIAL

## 2018-03-01 DIAGNOSIS — R91.1 LUNG NODULE: ICD-10-CM

## 2018-03-01 PROCEDURE — 71250 CT THORAX DX C-: CPT

## 2018-03-22 PROBLEM — J09.X2 INFLUENZA A (H5N1): Status: RESOLVED | Noted: 2017-04-19 | Resolved: 2018-03-22

## 2018-03-22 PROBLEM — E89.0 POSTSURGICAL HYPOTHYROIDISM: Status: ACTIVE | Noted: 2017-11-06

## 2018-04-10 ENCOUNTER — HOSPITAL ENCOUNTER (OUTPATIENT)
Dept: MAMMOGRAPHY | Age: 43
Discharge: HOME | End: 2018-04-10
Payer: COMMERCIAL

## 2018-04-10 DIAGNOSIS — Z12.31 VISIT FOR SCREENING MAMMOGRAM: ICD-10-CM

## 2018-04-10 PROCEDURE — 77067 SCR MAMMO BI INCL CAD: CPT

## 2018-06-14 PROBLEM — F32.A MILD DEPRESSION: Status: ACTIVE | Noted: 2018-06-14

## 2018-06-14 PROBLEM — F40.01 PANIC DISORDER WITH AGORAPHOBIA: Status: ACTIVE | Noted: 2018-06-14

## 2018-06-22 ENCOUNTER — APPOINTMENT (OUTPATIENT)
Dept: GENERAL RADIOLOGY | Age: 43
End: 2018-06-22
Attending: EMERGENCY MEDICINE
Payer: COMMERCIAL

## 2018-06-22 ENCOUNTER — HOSPITAL ENCOUNTER (EMERGENCY)
Age: 43
Discharge: HOME | End: 2018-06-22
Attending: EMERGENCY MEDICINE
Payer: COMMERCIAL

## 2018-06-22 VITALS
OXYGEN SATURATION: 99 % | SYSTOLIC BLOOD PRESSURE: 118 MMHG | WEIGHT: 247 LBS | RESPIRATION RATE: 16 BRPM | HEART RATE: 94 BPM | TEMPERATURE: 98.9 F | DIASTOLIC BLOOD PRESSURE: 85 MMHG | HEIGHT: 63 IN | BODY MASS INDEX: 43.77 KG/M2

## 2018-06-22 DIAGNOSIS — J47.1 BRONCHIECTASIS WITH (ACUTE) EXACERBATION (HCC): Primary | ICD-10-CM

## 2018-06-22 LAB
ALBUMIN SERPL-MCNC: 3.6 G/DL (ref 3.5–5)
ALBUMIN/GLOB SERPL: 0.9 {RATIO} (ref 1.2–3.5)
ALP SERPL-CCNC: 90 U/L (ref 50–136)
ALT SERPL-CCNC: 42 U/L (ref 12–65)
ANION GAP SERPL CALC-SCNC: 10 MMOL/L (ref 7–16)
AST SERPL-CCNC: 40 U/L (ref 15–37)
BASOPHILS # BLD: 0 K/UL (ref 0–0.2)
BASOPHILS NFR BLD: 0 % (ref 0–2)
BILIRUB SERPL-MCNC: 0.8 MG/DL (ref 0.2–1.1)
BUN SERPL-MCNC: 13 MG/DL (ref 6–23)
CALCIUM SERPL-MCNC: 8.6 MG/DL (ref 8.3–10.4)
CHLORIDE SERPL-SCNC: 101 MMOL/L (ref 98–107)
CO2 SERPL-SCNC: 27 MMOL/L (ref 21–32)
CREAT SERPL-MCNC: 0.9 MG/DL (ref 0.6–1)
DIFFERENTIAL METHOD BLD: ABNORMAL
EOSINOPHIL # BLD: 0.2 K/UL (ref 0–0.8)
EOSINOPHIL NFR BLD: 2 % (ref 0.5–7.8)
ERYTHROCYTE [DISTWIDTH] IN BLOOD BY AUTOMATED COUNT: 14.2 % (ref 11.9–14.6)
GLOBULIN SER CALC-MCNC: 3.9 G/DL (ref 2.3–3.5)
GLUCOSE SERPL-MCNC: 91 MG/DL (ref 65–100)
HCT VFR BLD AUTO: 44.2 % (ref 35.8–46.3)
HGB BLD-MCNC: 14.7 G/DL (ref 11.7–15.4)
IMM GRANULOCYTES # BLD: 0 K/UL (ref 0–0.5)
IMM GRANULOCYTES NFR BLD AUTO: 0 % (ref 0–5)
LACTATE BLD-SCNC: 0.9 MMOL/L (ref 0.5–1.9)
LYMPHOCYTES # BLD: 1.3 K/UL (ref 0.5–4.6)
LYMPHOCYTES NFR BLD: 11 % (ref 13–44)
MCH RBC QN AUTO: 31.8 PG (ref 26.1–32.9)
MCHC RBC AUTO-ENTMCNC: 33.3 G/DL (ref 31.4–35)
MCV RBC AUTO: 95.7 FL (ref 79.6–97.8)
MONOCYTES # BLD: 0.4 K/UL (ref 0.1–1.3)
MONOCYTES NFR BLD: 3 % (ref 4–12)
NEUTS SEG # BLD: 10.1 K/UL (ref 1.7–8.2)
NEUTS SEG NFR BLD: 84 % (ref 43–78)
PLATELET # BLD AUTO: 402 K/UL (ref 150–450)
PMV BLD AUTO: 10.1 FL (ref 10.8–14.1)
POTASSIUM SERPL-SCNC: 3.5 MMOL/L (ref 3.5–5.1)
PROCALCITONIN SERPL-MCNC: 0.1 NG/ML
PROT SERPL-MCNC: 7.5 G/DL (ref 6.3–8.2)
RBC # BLD AUTO: 4.62 M/UL (ref 4.05–5.25)
SODIUM SERPL-SCNC: 138 MMOL/L (ref 136–145)
WBC # BLD AUTO: 12.1 K/UL (ref 4.3–11.1)

## 2018-06-22 PROCEDURE — 87040 BLOOD CULTURE FOR BACTERIA: CPT | Performed by: EMERGENCY MEDICINE

## 2018-06-22 PROCEDURE — 74011250636 HC RX REV CODE- 250/636: Performed by: EMERGENCY MEDICINE

## 2018-06-22 PROCEDURE — 96375 TX/PRO/DX INJ NEW DRUG ADDON: CPT | Performed by: EMERGENCY MEDICINE

## 2018-06-22 PROCEDURE — 71046 X-RAY EXAM CHEST 2 VIEWS: CPT

## 2018-06-22 PROCEDURE — 80053 COMPREHEN METABOLIC PANEL: CPT | Performed by: EMERGENCY MEDICINE

## 2018-06-22 PROCEDURE — 84145 PROCALCITONIN (PCT): CPT | Performed by: EMERGENCY MEDICINE

## 2018-06-22 PROCEDURE — 74011000250 HC RX REV CODE- 250: Performed by: EMERGENCY MEDICINE

## 2018-06-22 PROCEDURE — 85025 COMPLETE CBC W/AUTO DIFF WBC: CPT | Performed by: EMERGENCY MEDICINE

## 2018-06-22 PROCEDURE — 99284 EMERGENCY DEPT VISIT MOD MDM: CPT | Performed by: EMERGENCY MEDICINE

## 2018-06-22 PROCEDURE — 96365 THER/PROPH/DIAG IV INF INIT: CPT | Performed by: EMERGENCY MEDICINE

## 2018-06-22 PROCEDURE — 94640 AIRWAY INHALATION TREATMENT: CPT

## 2018-06-22 PROCEDURE — 83605 ASSAY OF LACTIC ACID: CPT

## 2018-06-22 RX ORDER — LEVOFLOXACIN 750 MG/1
750 TABLET ORAL DAILY
Qty: 7 TAB | Refills: 0 | Status: SHIPPED | OUTPATIENT
Start: 2018-06-22 | End: 2018-07-17 | Stop reason: ALTCHOICE

## 2018-06-22 RX ORDER — DEXAMETHASONE 2 MG/1
TABLET ORAL
Qty: 32 TAB | Refills: 0 | Status: SHIPPED | OUTPATIENT
Start: 2018-06-22 | End: 2018-07-17 | Stop reason: ALTCHOICE

## 2018-06-22 RX ORDER — LEVOFLOXACIN 5 MG/ML
750 INJECTION, SOLUTION INTRAVENOUS
Status: COMPLETED | OUTPATIENT
Start: 2018-06-22 | End: 2018-06-22

## 2018-06-22 RX ORDER — IPRATROPIUM BROMIDE AND ALBUTEROL SULFATE 2.5; .5 MG/3ML; MG/3ML
3 SOLUTION RESPIRATORY (INHALATION)
Status: COMPLETED | OUTPATIENT
Start: 2018-06-22 | End: 2018-06-22

## 2018-06-22 RX ORDER — KETOROLAC TROMETHAMINE 30 MG/ML
30 INJECTION, SOLUTION INTRAMUSCULAR; INTRAVENOUS
Status: COMPLETED | OUTPATIENT
Start: 2018-06-22 | End: 2018-06-22

## 2018-06-22 RX ADMIN — METHYLPREDNISOLONE SODIUM SUCCINATE 125 MG: 125 INJECTION, POWDER, FOR SOLUTION INTRAMUSCULAR; INTRAVENOUS at 18:26

## 2018-06-22 RX ADMIN — LEVOFLOXACIN 750 MG: 5 INJECTION, SOLUTION INTRAVENOUS at 18:26

## 2018-06-22 RX ADMIN — KETOROLAC TROMETHAMINE 30 MG: 30 INJECTION, SOLUTION INTRAMUSCULAR at 18:26

## 2018-06-22 RX ADMIN — SODIUM CHLORIDE 1000 ML: 900 INJECTION, SOLUTION INTRAVENOUS at 18:25

## 2018-06-22 RX ADMIN — IPRATROPIUM BROMIDE AND ALBUTEROL SULFATE 3 ML: .5; 3 SOLUTION RESPIRATORY (INHALATION) at 18:49

## 2018-06-22 NOTE — DISCHARGE INSTRUCTIONS
Bronchiectasis: Care Instructions  Your Care Instructions  Bronchiectasis (say \"gqlnn-glc-PCR-tuh-heber\") is a lung problem in which the breathing tubes are stretched and become larger. The buildup of mucus causes the stretching and can lead to swelling and infections. You may find it harder to breathe and cough up mucus out of your lungs. Some people are born with it. Other people get it because of another health problem, usually cystic fibrosis or a lung infection such as pneumonia or tuberculosis. Symptoms are often different for everyone. But a cough that brings up mucus, or sputum, is common. The condition is usually treated with antibiotics, medicines to relax the airways (bronchodilators), and medicines to make it easier to cough up mucus (expectorants). Follow-up care is a key part of your treatment and safety. Be sure to make and go to all appointments, and call your doctor if you are having problems. It's also a good idea to know your test results and keep a list of the medicines you take. How can you care for yourself at home? · Take your antibiotics as directed. Do not stop taking them just because you feel better. You need to take the full course of antibiotics. · Take your medicines exactly as prescribed. Call your doctor if you think you are having a problem with your medicine. · If you have cystic fibrosis, follow your treatment plan. · If you or your child has bronchiectasis, follow directions from your doctor or respiratory therapist for moving your or your child's body into different positions to help drain fluid. This is called postural drainage, and it helps to ease breathing and prevent infections. · You also may do chest percussion on your child. This is strong clapping of the chest with a cupped hand to vibrate the airways in the lungs. The vibration helps your child cough up mucus. You may see a respiratory therapist to learn how to do chest percussion.   · Use an airway clearance device, such as a flutter valve, as directed to help remove mucus from the lungs. · Avoid lung infections. ¨ Get shots to protect against the flu and pneumococcal disease. ¨ Wash your hands frequently. ¨ Avoid close contact with people who have colds or the flu. ¨ Do not smoke or allow others to smoke around you. If you need help quitting, talk to your doctor about stop-smoking programs and medicines. These can increase your chances of quitting for good. ¨ Stay inside, if you can, on days when the pollution level is high. When should you call for help? Call 911 anytime you think you may need emergency care. For example, call if:  ? · You have severe trouble breathing. ? · You cough up more than 1 cup of blood. ?Call your doctor now or seek immediate medical care if:  ? · You have chest pain. ? · You have shortness of breath. ? · You have a fever. ? · Your mucus (sputum) is bloody or changes color. ? Watch closely for changes in your health, and be sure to contact your doctor if:  ? · You are coughing up more sputum than before. ? · Your symptoms get worse or do not get better with treatment. Where can you learn more? Go to http://enrique-miranda.info/. Enter C667 in the search box to learn more about \"Bronchiectasis: Care Instructions. \"  Current as of: May 12, 2017  Content Version: 11.4  © 7800-3979 [employer]. Care instructions adapted under license by [employer] (which disclaims liability or warranty for this information). If you have questions about a medical condition or this instruction, always ask your healthcare professional. [place] [address] any warranty or liability for your use of this information.

## 2018-06-22 NOTE — ED TRIAGE NOTES
Pt states for the last week or so she has been feeling more under the weather. Pt states hx of pulmonary problems and has brown sputum. Pt states she woke up with a fever and she feels nauseous and dizzy.

## 2018-06-22 NOTE — ED PROVIDER NOTES
HPI Comments: Presents with complaint cough and shortness of breath. States she's been feeling unwell for about a week. She describes it as \"just not right. .\"  She has a history of asthma and bronchiectasis and uses a smart past.  She reports increased fatigue. Over the past 24 hours had increasing cough with silvano sputum production, chills, fever 102.5. She called a nurse practitioner from [facility] pulmonary was told to go to the ER or urgent care. Patient is a 39 y.o. female presenting with cough. The history is provided by the patient. Cough   This is a new problem. The current episode started yesterday. The problem occurs constantly. The problem has been gradually worsening. The cough is productive of sputum. There has been a fever of 102 - 102.9 F. The fever has been present for less than 1 day. Associated symptoms include shortness of breath and wheezing. Pertinent negatives include no chest pain, no chills, no sweats, no myalgias, no nausea and no vomiting. She has tried nothing for the symptoms. She is not a smoker. Her past medical history is significant for pneumonia, bronchiectasis and asthma. Her past medical history does not include CHF. Past Medical History:   Diagnosis Date    Recurrent pneumonia [id number]    The patient reports having been hospitalized multiple times with recurrent pneumonias during a 2 year period.  Thromboembolism ([address] [id number]    During one of the patient's hospitalizations, she developed deep vein thrombosis and pulmonary emboli. She took Coumadin for approximately 2 years.        Past Surgical History:   Procedure Laterality Date    Jeimy Abreu      09/2016-04/2017    ENDOMETRIAL CRYOABLATION      HX CHOLECYSTECTOMY  1994    for stones    HX HEENT      T [employer] A    HX MYOMECTOMY      ablasion and polyp    HX THYROIDECTOMY  02/08/2017    Dr. Jamil Israel         Family History:   Problem Relation Age of Onset  COPD Mother    [facility] Cancer Mother      Lung    Breast Cancer Mother 54    Diabetes Father     Heart Disease Father     Heart Disease Sister     Thyroid Disease Sister      Hashimoto's    Hypertension Brother     Thyroid Disease Maternal Grandmother      Hypothyroidism    Thyroid Disease Daughter      Hashimoto's    Thyroid Cancer Neg Hx        Social History     Social History    Marital status:      Spouse name: N/A    Number of children: N/A    Years of education: N/A     Occupational History          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 She does not use alcohol. She had considerable secondhand exposure to cigarette smoke as a child from both of her parents who smoked. There is no significant environmental or industrial exposure. She has no known exposure to TB. She has always lived in this area. She has no indoor pets or birds. She is  and living with her . ALLERGIES: Keflex [cephalexin]; Vancomycin; Penicillins; Sulfa (sulfonamide antibiotics); and Sulfamethoxazole    Review of Systems   Constitutional: Positive for fever. Negative for chills. Respiratory: Positive for cough, shortness of breath and wheezing. Cardiovascular: Negative for chest pain. Gastrointestinal: Negative for nausea and vomiting. Musculoskeletal: Negative for myalgias. Skin: Negative for rash and wound. All other systems reviewed and are negative.       Vitals:    06/22/18 1658 06/22/18 1825   BP: (!) 147/91 125/81   Pulse: (!) 114 97   Resp: 16    Temp: 100.3 °F (37.9 °C)    SpO2: 97% 96%   Weight: 112 kg (247 lb)    Height: 5' 3\" (1.6 m)             Physical Exam   Constitutional: She is oriented to person, place, and time. She appears well-developed and well-nourished. No distress. HENT:   Head: Normocephalic and atraumatic. Neck: Normal range of motion. Neck supple. Cardiovascular: Normal rate and regular rhythm. Pulmonary/Chest: Effort normal and breath sounds normal. No respiratory distress. She has no wheezes. Occasional crackles   Abdominal: Soft. She exhibits no distension. There is no tenderness. There is no rebound and no guarding. Musculoskeletal: Normal range of motion. She exhibits no edema or tenderness. Neurological: She is alert and oriented to person, place, and time. No cranial nerve deficit. Coordination normal.   Skin: Skin is warm and dry. No rash noted. She is not diaphoretic. No erythema. Psychiatric: Her mood appears anxious. Nursing note and vitals reviewed. MDM  Number of Diagnoses or Management Options  Diagnosis management comments: Patient given Levaquin and fluids tonight and we'll reevaluate. Pulmonary pulmonary not think based on her labs and chest x-ray and sats that she does not need to be admitted. Patient has been very anxious about her fever. Explained lactic and procalcitonin are neg, sats nl/does not meet admission criteria. Offered obs admission with hospitalist but explained that meant insurance would not pay. Prefer to go home. D/w them that pt has bad lung disease and cannot predict future but at this moment things look good and we would be happy to see her at any time should things change. Home with levaquin, decadron.              Amount and/or Complexity of Data Reviewed  Clinical lab tests: ordered and reviewed  Discuss the patient with other providers: yes    Risk of Complications, Morbidity, and/or Mortality  Presenting problems: high  Diagnostic procedures: moderate  Management options: moderate    Patient Progress  Patient progress: stable        ED Course       Procedures

## 2018-06-23 NOTE — ED NOTES
I have reviewed discharge instructions with the patient. The patient verbalized understanding. Patient left ED via Discharge Method: ambulatory to Home with family    Opportunity for questions and clarification provided. Patient given 2 scripts. To continue your aftercare when you leave the hospital, you may receive an automated call from our care team to check in on how you are doing. This is a free service and part of our promise to provide the best care and service to meet your aftercare needs.  If you have questions, or wish to unsubscribe from this service please call [phone]. Thank you for Choosing our [facility].

## 2018-06-27 LAB
BACTERIA SPEC CULT: NORMAL
SERVICE CMNT-IMP: NORMAL

## 2018-07-24 ENCOUNTER — HOSPITAL ENCOUNTER (OUTPATIENT)
Dept: CT IMAGING | Age: 43
Discharge: HOME | End: 2018-07-24
Payer: COMMERCIAL

## 2018-07-24 PROCEDURE — 71250 CT THORAX DX C-: CPT

## 2019-12-02 ENCOUNTER — HOSPITAL ENCOUNTER (OUTPATIENT)
Dept: GENERAL RADIOLOGY | Age: 44
Discharge: HOME | End: 2019-12-02
Payer: COMMERCIAL

## 2019-12-02 DIAGNOSIS — M13.0 POLYARTHRITIS: ICD-10-CM

## 2019-12-02 PROCEDURE — 72072 X-RAY EXAM THORAC SPINE 3VWS: CPT

## 2019-12-02 PROCEDURE — 72202 X-RAY EXAM SI JOINTS 3/> VWS: CPT

## (undated) DEVICE — KENDALL RADIOLUCENT FOAM MONITORING ELECTRODE RECTANGULAR SHAPE: Brand: KENDALL

## (undated) DEVICE — BRONCHOSCOPY PACK: Brand: MEDLINE INDUSTRIES, INC.

## (undated) DEVICE — SINGLE USE SUCTION VALVE MAJ-209: Brand: SINGLE USE SUCTION VALVE (STERILE)

## (undated) DEVICE — SINGLE USE BIOPSY VALVE MAJ-210: Brand: SINGLE USE BIOPSY VALVE (STERILE)